# Patient Record
Sex: FEMALE | Race: WHITE | Employment: FULL TIME | URBAN - METROPOLITAN AREA
[De-identification: names, ages, dates, MRNs, and addresses within clinical notes are randomized per-mention and may not be internally consistent; named-entity substitution may affect disease eponyms.]

---

## 2017-09-21 ENCOUNTER — OFFICE VISIT (OUTPATIENT)
Dept: FAMILY MEDICINE CLINIC | Age: 47
End: 2017-09-21

## 2017-09-21 VITALS
SYSTOLIC BLOOD PRESSURE: 119 MMHG | HEIGHT: 62 IN | OXYGEN SATURATION: 98 % | WEIGHT: 135.8 LBS | RESPIRATION RATE: 16 BRPM | HEART RATE: 87 BPM | DIASTOLIC BLOOD PRESSURE: 80 MMHG | BODY MASS INDEX: 24.99 KG/M2 | TEMPERATURE: 98.3 F

## 2017-09-21 DIAGNOSIS — Z00.00 ROUTINE GENERAL MEDICAL EXAMINATION AT A HEALTH CARE FACILITY: Primary | ICD-10-CM

## 2017-09-21 DIAGNOSIS — L72.9 SCALP CYST: ICD-10-CM

## 2017-09-21 DIAGNOSIS — Z11.3 SCREEN FOR STD (SEXUALLY TRANSMITTED DISEASE): ICD-10-CM

## 2017-09-21 RX ORDER — ALPRAZOLAM 0.5 MG/1
TABLET ORAL 2 TIMES DAILY
COMMUNITY
End: 2021-11-23 | Stop reason: SDUPTHER

## 2017-09-21 RX ORDER — ZOLPIDEM TARTRATE 10 MG/1
TABLET ORAL
COMMUNITY
End: 2021-03-22 | Stop reason: SDUPTHER

## 2017-09-21 RX ORDER — OXCARBAZEPINE 300 MG/1
TABLET, FILM COATED ORAL 3 TIMES DAILY
COMMUNITY
End: 2021-02-23

## 2017-09-21 RX ORDER — DEXTROAMPHETAMINE SACCHARATE, AMPHETAMINE ASPARTATE, DEXTROAMPHETAMINE SULFATE AND AMPHETAMINE SULFATE 2.5; 2.5; 2.5; 2.5 MG/1; MG/1; MG/1; MG/1
20 TABLET ORAL 3 TIMES DAILY
COMMUNITY
Start: 2017-09-05 | End: 2021-03-25 | Stop reason: SDUPTHER

## 2017-09-21 NOTE — PROGRESS NOTES
Chief Complaint   Patient presents with    New Patient     Establish Care    Physical     Patient seen in the office today to establish care and a physical. Patient is requesting labs    Subjective: (As above and below)     Chief Complaint   Patient presents with    New Patient     Establish Care    Physical     she is a 52y.o. year old female who presents for evaluation. Reviewed PmHx, RxHx, FmHx, SocHx, AllgHx and updated in chart. Review of Systems - negative except as listed above    Objective:     Vitals:    09/21/17 1422   BP: 119/80   Pulse: 87   Resp: 16   Temp: 98.3 °F (36.8 °C)   TempSrc: Oral   SpO2: 98%   Weight: 135 lb 12.8 oz (61.6 kg)   Height: 5' 2\" (1.575 m)     Physical Examination: General appearance - alert, well appearing, and in no distress  Mental status - normal mood, behavior, speech, dress, motor activity, and thought processes  Mouth - mucous membranes moist, pharynx normal without lesions  Chest - clear to auscultation, no wheezes, rales or rhonchi, symmetric air entry  Heart - normal rate, regular rhythm, normal S1, S2, no murmurs, rubs, clicks or gallops  Musculoskeletal - no joint tenderness, deformity or swelling  Extremities - peripheral pulses normal, no pedal edema, no clubbing or cyanosis    Assessment/ Plan:   1. Routine general medical examination at a health care facility  Check labs  - CBC WITH AUTOMATED DIFF  - LIPID PANEL  - METABOLIC PANEL, COMPREHENSIVE  - TSH 3RD GENERATION  - VITAMIN D, 25 HYDROXY  - HEMOGLOBIN A1C WITH EAG    2. Screen for STD (sexually transmitted disease)  - HIV 1/2 AG/AB, 4TH GENERATION,W RFLX CONFIRM  - RPR  - HEPATITIS C AB  - NUSWAB VAGINITIS PLUS     Follow-up Disposition: As needed  I have discussed the diagnosis with the patient and the intended plan as seen in the above orders. The patient has received an after-visit summary and questions were answered concerning future plans.      Medication Side Effects and Warnings were discussed with patient: yes  Patient Labs were reviewed: yes  Patient Past Records were reviewed:  yes    Dariusz Lunsford M.D.

## 2017-09-21 NOTE — MR AVS SNAPSHOT
Visit Information Date & Time Provider Department Dept. Phone Encounter #  
 9/21/2017  2:00 PM Dewey White MD [address] [facility] [phone] [id number] Upcoming Health Maintenance Date Due DTaP/Tdap/Td series (1 - Tdap) 6/3/1991 PAP AKA CERVICAL CYTOLOGY 6/3/1991 Allergies as of 9/21/2017  Review Complete On: 9/21/2017 By: Janay Crane MD  
  
 Severity Noted Reaction Type Reactions Pcn [Penicillins]  09/21/2017    Unknown (comments) Pt states reaction as toddler, does not know reaction Current Immunizations  Never Reviewed Name Date Influenza Vaccine 9/10/2017 Not reviewed this visit You Were Diagnosed With   
  
 Codes Comments Routine general medical examination at a health care facility    -  Primary ICD-10-CM: Z00.00 ICD-9-CM: V70.0 Screen for STD (sexually transmitted disease)     ICD-10-CM: Z11.3 ICD-9-CM: V74.5 Vitals BP Pulse Temp Resp Height(growth percentile) Weight(growth percentile) 119/80 (BP 1 Location: Left arm, BP Patient Position: Sitting) 87 98.3 °F (36.8 °C) (Oral) 16 5' 2\" (1.575 m) 135 lb 12.8 oz (61.6 kg) LMP SpO2 BMI OB Status Smoking Status 09/15/2017 98% 24.84 kg/m2 Having regular periods Current Some Day Smoker Vitals History BMI and BSA Data Body Mass Index Body Surface Area  
 24.84 kg/m 2 1.64 m 2 Preferred Pharmacy Pharmacy Name Phone [facility] [address], [address] [phone] Your Updated Medication List  
  
   
This list is accurate as of: 9/21/17  3:04 PM.  Always use your most recent med list.  
  
  
  
  
 AMBIEN 10 mg tablet Generic drug:  zolpidem Take  by mouth nightly as needed for Sleep. dextroamphetamine-amphetamine 10 mg tablet Commonly known as:  ADDERALL 30 mg daily. OXcarbazepine 300 mg tablet Commonly known as:  TRILEPTAL  
 Take  by mouth three (3) times daily. XANAX 0.5 mg tablet Generic drug:  ALPRAZolam  
Take  by mouth two (2) times a day. Indications: prn We Performed the Following CBC WITH AUTOMATED DIFF [13241 CPT(R)] HEMOGLOBIN A1C WITH EAG [81802 CPT(R)] HEPATITIS C AB [22314 CPT(R)] HIV 1/2 AG/AB, 4TH GENERATION,W RFLX CONFIRM [[id number] Custom] LIPID PANEL [23158 CPT(R)] METABOLIC PANEL, COMPREHENSIVE [96098 CPT(R)] [address] [id number] Custom] RPR [87103 CPT(R)] TSH 3RD GENERATION [85152 CPT(R)] VITAMIN D, 25 HYDROXY [id number] CPT(R)] Introducing [facility]! Gage Marshall introduces Welocalize patient portal. Now you can access parts of your medical record, email your doctor's office, and request medication refills online. 1. In your internet browser, go to [email or web address] 2. Click on the First Time User? Click Here link in the Sign In box. You will see the New Member Sign Up page. 3. Enter your Welocalize Access Code exactly as it appears below. You will not need to use this code after youve completed the sign-up process. If you do not sign up before the expiration date, you must request a new code. · Welocalize Access Code: [email or web address] Expires: 12/20/2017  3:03 PM 
 
4. Enter the last four digits of your Social Security Number (xxxx) and Date of Birth (mm/dd/yyyy) as indicated and click Submit. You will be taken to the next sign-up page. 5. Create a Welocalize ID. This will be your Welocalize login ID and cannot be changed, so think of one that is secure and easy to remember. 6. Create a Welocalize password. You can change your password at any time. 7. Enter your Password Reset Question and Answer. This can be used at a later time if you forget your password. 8. Enter your e-mail address. You will receive e-mail notification when new information is available in [address] 9. Click Sign Up. You can now view and download portions of your medical record. 10. Click the Download Summary menu link to download a portable copy of your medical information. If you have questions, please visit the Frequently Asked Questions section of the eRepublik website. Remember, eRepublik is NOT to be used for urgent needs. For medical emergencies, dial 911. Now available from your iPhone and Android! Please provide this summary of care documentation to your next provider. Your primary care clinician is listed as Shaw White. If you have any questions after today's visit, please call [phone].

## 2017-09-21 NOTE — PROGRESS NOTES
Chief Complaint   Patient presents with    New Patient     Establish Care    Physical     Patient seen in the office today to establish care and a physical. Patient is requesting labs

## 2017-09-22 LAB
25(OH)D3+25(OH)D2 SERPL-MCNC: 40.4 NG/ML (ref 30–100)
ALBUMIN SERPL-MCNC: 4.6 G/DL (ref 3.5–5.5)
ALBUMIN/GLOB SERPL: 1.7 {RATIO} (ref 1.2–2.2)
ALP SERPL-CCNC: 64 IU/L (ref 39–117)
ALT SERPL-CCNC: 14 IU/L (ref 0–32)
AST SERPL-CCNC: 19 IU/L (ref 0–40)
BASOPHILS # BLD AUTO: 0 X10E3/UL (ref 0–0.2)
BASOPHILS NFR BLD AUTO: 0 %
BILIRUB SERPL-MCNC: 0.3 MG/DL (ref 0–1.2)
BUN SERPL-MCNC: 11 MG/DL (ref 6–24)
BUN/CREAT SERPL: 15 (ref 9–23)
CALCIUM SERPL-MCNC: 9.6 MG/DL (ref 8.7–10.2)
CHLORIDE SERPL-SCNC: 100 MMOL/L (ref 96–106)
CHOLEST SERPL-MCNC: 228 MG/DL (ref 100–199)
CO2 SERPL-SCNC: 21 MMOL/L (ref 18–29)
CREAT SERPL-MCNC: 0.74 MG/DL (ref 0.57–1)
EOSINOPHIL # BLD AUTO: 0.2 X10E3/UL (ref 0–0.4)
EOSINOPHIL NFR BLD AUTO: 2 %
ERYTHROCYTE [DISTWIDTH] IN BLOOD BY AUTOMATED COUNT: 13.3 % (ref 12.3–15.4)
EST. AVERAGE GLUCOSE BLD GHB EST-MCNC: 103 MG/DL
GLOBULIN SER CALC-MCNC: 2.7 G/DL (ref 1.5–4.5)
GLUCOSE SERPL-MCNC: 82 MG/DL (ref 65–99)
HBA1C MFR BLD: 5.2 % (ref 4.8–5.6)
HBV SURFACE AB SER QL: NON REACTIVE
HCT VFR BLD AUTO: 39.2 % (ref 34–46.6)
HCV AB S/CO SERPL IA: <0.1 S/CO RATIO (ref 0–0.9)
HDLC SERPL-MCNC: 66 MG/DL
HGB BLD-MCNC: 13.6 G/DL (ref 11.1–15.9)
HIV 1+2 AB+HIV1 P24 AG SERPL QL IA: NON REACTIVE
IMM GRANULOCYTES # BLD: 0 X10E3/UL (ref 0–0.1)
IMM GRANULOCYTES NFR BLD: 0 %
INTERPRETATION, 910389: NORMAL
LDLC SERPL CALC-MCNC: 140 MG/DL (ref 0–99)
LYMPHOCYTES # BLD AUTO: 1.8 X10E3/UL (ref 0.7–3.1)
LYMPHOCYTES NFR BLD AUTO: 20 %
MCH RBC QN AUTO: 33.3 PG (ref 26.6–33)
MCHC RBC AUTO-ENTMCNC: 34.7 G/DL (ref 31.5–35.7)
MCV RBC AUTO: 96 FL (ref 79–97)
MONOCYTES # BLD AUTO: 0.7 X10E3/UL (ref 0.1–0.9)
MONOCYTES NFR BLD AUTO: 8 %
NEUTROPHILS # BLD AUTO: 6.1 X10E3/UL (ref 1.4–7)
NEUTROPHILS NFR BLD AUTO: 70 %
PLATELET # BLD AUTO: 317 X10E3/UL (ref 150–379)
POTASSIUM SERPL-SCNC: 4.1 MMOL/L (ref 3.5–5.2)
PROT SERPL-MCNC: 7.3 G/DL (ref 6–8.5)
RBC # BLD AUTO: 4.09 X10E6/UL (ref 3.77–5.28)
RPR SER QL: NON REACTIVE
SODIUM SERPL-SCNC: 141 MMOL/L (ref 134–144)
TRIGL SERPL-MCNC: 112 MG/DL (ref 0–149)
TSH SERPL DL<=0.005 MIU/L-ACNC: 0.71 UIU/ML (ref 0.45–4.5)
VLDLC SERPL CALC-MCNC: 22 MG/DL (ref 5–40)
WBC # BLD AUTO: 8.9 X10E3/UL (ref 3.4–10.8)

## 2017-09-22 NOTE — PROGRESS NOTES
Pt is not immune to Hep B, she needs to complete a 3 vaccine series. Please inform. All other labs are within normal limits.

## 2017-09-24 LAB
A VAGINAE DNA VAG QL NAA+PROBE: NORMAL SCORE
BVAB2 DNA VAG QL NAA+PROBE: NORMAL SCORE
C ALBICANS DNA VAG QL NAA+PROBE: NEGATIVE
C GLABRATA DNA VAG QL NAA+PROBE: NEGATIVE
C TRACH RRNA SPEC QL NAA+PROBE: NEGATIVE
MEGA1 DNA VAG QL NAA+PROBE: NORMAL SCORE
N GONORRHOEA RRNA SPEC QL NAA+PROBE: NEGATIVE
T VAGINALIS RRNA SPEC QL NAA+PROBE: NEGATIVE

## 2017-09-27 ENCOUNTER — OFFICE VISIT (OUTPATIENT)
Dept: SURGERY | Age: 47
End: 2017-09-27

## 2017-09-27 VITALS
HEART RATE: 94 BPM | WEIGHT: 135 LBS | SYSTOLIC BLOOD PRESSURE: 140 MMHG | BODY MASS INDEX: 24.84 KG/M2 | DIASTOLIC BLOOD PRESSURE: 90 MMHG | HEIGHT: 62 IN | OXYGEN SATURATION: 98 % | RESPIRATION RATE: 20 BRPM

## 2017-09-27 DIAGNOSIS — L72.3 SEBACEOUS CYST: Primary | ICD-10-CM

## 2017-09-27 NOTE — MR AVS SNAPSHOT
Visit Information Date & Time Provider Department Dept. Phone Encounter #  
 9/27/2017  3:20 PM Lamar Henao MD [address] [phone] Upcoming Health Maintenance Date Due DTaP/Tdap/Td series (1 - Tdap) 6/3/1991 PAP AKA CERVICAL CYTOLOGY 6/3/1991 Allergies as of 9/27/2017  Review Complete On: 9/27/2017 By: Laya Gentile Severity Noted Reaction Type Reactions Pcn [Penicillins]  09/21/2017    Unknown (comments) Pt states reaction as toddler, does not know reaction Current Immunizations  Never Reviewed Name Date Influenza Vaccine 9/10/2017 Not reviewed this visit Vitals BP Pulse Resp Height(growth percentile) Weight(growth percentile) LMP  
 140/90 (BP 1 Location: Left arm, BP Patient Position: Sitting) 94 20 5' 2\" (1.575 m) 135 lb (61.2 kg) 09/15/2017 SpO2 BMI OB Status Smoking Status 98% 24.69 kg/m2 Having regular periods Current Some Day Smoker Vitals History BMI and BSA Data Body Mass Index Body Surface Area  
 24.69 kg/m 2 1.64 m 2 Preferred Pharmacy Pharmacy Name Phone Jadon Holden [address], [address] [phone] Your Updated Medication List  
  
   
This list is accurate as of: 9/27/17  4:11 PM.  Always use your most recent med list.  
  
  
  
  
 AMBIEN 10 mg tablet Generic drug:  zolpidem Take  by mouth nightly as needed for Sleep. dextroamphetamine-amphetamine 10 mg tablet Commonly known as:  ADDERALL 30 mg daily. OXcarbazepine 300 mg tablet Commonly known as:  TRILEPTAL Take  by mouth three (3) times daily. XANAX 0.5 mg tablet Generic drug:  ALPRAZolam  
Take  by mouth two (2) times a day. Indications: prn Introducing [facility]!    
 Mariella Fernandes introduces [employer] patient portal. Now you can access parts of your medical record, email your doctor's office, and request medication refills online. 1. In your internet browser, go to [email or web address] 2. Click on the First Time User? Click Here link in the Sign In box. You will see the New Member Sign Up page. 3. Enter your MarkMonitor Access Code exactly as it appears below. You will not need to use this code after youve completed the sign-up process. If you do not sign up before the expiration date, you must request a new code. · MarkMonitor Access Code: [email or web address] Expires: 12/20/2017  3:03 PM 
 
4. Enter the last four digits of your Social Security Number (xxxx) and Date of Birth (mm/dd/yyyy) as indicated and click Submit. You will be taken to the next sign-up page. 5. Create a MarkMonitor ID. This will be your MarkMonitor login ID and cannot be changed, so think of one that is secure and easy to remember. 6. Create a MarkMonitor password. You can change your password at any time. 7. Enter your Password Reset Question and Answer. This can be used at a later time if you forget your password. 8. Enter your e-mail address. You will receive e-mail notification when new information is available in [address] 9. Click Sign Up. You can now view and download portions of your medical record. 10. Click the Download Summary menu link to download a portable copy of your medical information. If you have questions, please visit the Frequently Asked Questions section of the MarkMonitor website. Remember, MarkMonitor is NOT to be used for urgent needs. For medical emergencies, dial 911. Now available from your iPhone and Android! Please provide this summary of care documentation to your next provider. Your primary care clinician is listed as Shaw White. If you have any questions after today's visit, please call [phone].

## 2017-09-27 NOTE — PROGRESS NOTES
1. Have you been to the ER, urgent care clinic since your last visit? Hospitalized since your last visit? No    2. Have you seen or consulted any other health care providers outside of the [address] [place] [address] since your last visit? Include any pap smears or colon screening.  No

## 2017-09-27 NOTE — LETTER
9/28/2017 2:36 PM 
 
Patient:  Kitty Dunham YOB: 1970 Date of Visit: 9/27/2017 Dear Aarno Sabillon MD 
[address] [address] [place] [address] VIA In Basket 
 : Thank you for referring Ms. Marlene Borja to me for evaluation/treatment. Below are the relevant portions of my assessment and plan of care. If you have questions, please do not hesitate to call me. I look forward to following Ms. Sigala along with you. Sincerely, Hanane Diaz MD

## 2017-09-28 NOTE — PATIENT INSTRUCTIONS
Epidermoid Cyst: Care Instructions  Your Care Instructions  An epidermoid (say \"vm-exo-JAF-hamida\") cyst is a lump just under the skin. These cysts can form when a hair follicle becomes blocked. They are common in acne and may occur on the face, neck, back, and genitals. However, they can form anywhere on the body. These cysts are not cancer and do not lead to cancer. They tend not to hurt, but they can sometimes become swollen and painful. They also may break open (rupture) and cause scarring. These cysts sometimes do not cause problems and may not need treatment. If you have a cyst that is swollen and hurts, your doctor may inject it with a medicine to help it heal. But it is more likely that a painful cyst will need to be removed. Your doctor will give you a shot of numbing medicine and cut into the cyst to drain it or remove it. This makes the symptoms go away. Follow-up care is a key part of your treatment and safety. Be sure to make and go to all appointments, and call your doctor if you are having problems. Its also a good idea to know your test results and keep a list of the medicines you take. How can you care for yourself at home? · Do not squeeze the cyst or poke it with a needle to open it. This can cause swelling, redness, and infection. · Always have a doctor look at any new lumps you get to make sure that they are not serious. When should you call for help? Watch closely for changes in your health, and be sure to contact your doctor if:  · You have a fever, redness, or swelling after you get a shot of medicine in the cyst.  · You see or feel a new lump on your skin. Where can you learn more? Go to http://ziabela-jimi.info/. Enter F504 in the search box to learn more about \"Epidermoid Cyst: Care Instructions. \"  Current as of: October 13, 2016  Content Version: 11.3  © 8141-5204 [employer].  Care instructions adapted under license by [employer] (which disclaims liability or warranty for this information). If you have questions about a medical condition or this instruction, always ask your healthcare professional. [address] any warranty or liability for your use of this information.

## 2017-09-28 NOTE — PROGRESS NOTES
[facility] History and Physical    History of Present Illness:      Steven Prince is a 52 y.o. female who has a sebaceous cyst of the scalp. The cyst has been present for many years but now is getting a little bigger. She does no have any pain, no drainage and no hx of infection. She would like to have it removed due to its large size. She hits it when she combing her hair and is also scarred it could get infected. Past Medical History:   Diagnosis Date    Anxiety     Depression     Hair loss     Poor appetite        Past Surgical History:   Procedure Laterality Date    HX GYN      2 C-sections         Current Outpatient Prescriptions:     dextroamphetamine-amphetamine (ADDERALL) 10 mg tablet, 30 mg daily. , Disp: , Rfl:     OXcarbazepine (TRILEPTAL) 300 mg tablet, Take  by mouth three (3) times daily. , Disp: , Rfl:     ALPRAZolam (XANAX) 0.5 mg tablet, Take  by mouth two (2) times a day. Indications: prn, Disp: , Rfl:     zolpidem (AMBIEN) 10 mg tablet, Take  by mouth nightly as needed for Sleep., Disp: , Rfl:     Allergies   Allergen Reactions    Pcn [Penicillins] Unknown (comments)     Pt states reaction as toddler, does not know reaction       Social History     Social History    Marital status:      Spouse name: N/A    Number of children: N/A    Years of education: N/A     Occupational History    Not on file.      Social History Main Topics    Smoking status: Current Some Day Smoker    Smokeless tobacco: Never Used    Alcohol use 6.0 oz/week     10 Glasses of wine per week      Comment: Weekly    Drug use: No    Sexual activity: Yes     Partners: Male     Other Topics Concern    Not on file     Social History Narrative       Family History   Problem Relation Age of Onset    Hypertension Mother     Hypertension Father     Cancer Maternal Aunt      Breast       ROS   Constitutional: negative  Ears, Nose, Mouth, Throat, and Face: negative  Respiratory: negative  Cardiovascular: negative  Gastrointestinal: negative  Genitourinary:negative  Integument/Breast: scalp sebaceous cyst  Hematologic/Lymphatic: negative  Behavioral/Psychiatric: negative  Allergic/Immunologic: negative      Physical Exam:     Visit Vitals    /90 (BP 1 Location: Left arm, BP Patient Position: Sitting)    Pulse 94    Resp 20    Ht 5' 2\" (1.575 m)    Wt 135 lb (61.2 kg)    SpO2 98%    BMI 24.69 kg/m2       General - alert and oriented, no apparent distress  HEENT - no jaundice, no hearing imparement  Pulm - CTAB, no C/W/R  CV - RRR, no M/R/G  Abd - soft, ND, BS present, NTTP  Ext - pulses intact in UE and LE bilaterally, no edema  Skin - supple, no rashes, L side of scalp with a 2cm sebaceous cyst present, no erythema, no drainage, soft, NTTP  Psychiatric - normal affect, good mood    Labs  none    Imaging  none  I have reviewed and agree with all of the pertinent images    Assessment:     Comfort Aragon is a 52 y.o. female with sebaceous cyst of the scalp    Recommendations:     1. She will need excision of the sebaceous cyst in the OR. I have discussed the above procedure with the patient in detail. We reviewed the benefits and possible complications of the surgery which include bleeding, infection, damage to adjacent organs, venous thromboembolism, need for repeat surgery, death and other unforseen complications. The patient agreed to proceed with the surgery. Sonia Costello MD    Ms. Pattie Valentin has a reminder for a \"due or due soon\" health maintenance. I have asked that she contact her primary care provider for follow-up on this health maintenance.

## 2017-10-05 ENCOUNTER — ANESTHESIA EVENT (OUTPATIENT)
Dept: SURGERY | Age: 47
End: 2017-10-05
Payer: COMMERCIAL

## 2017-10-06 ENCOUNTER — ANESTHESIA (OUTPATIENT)
Dept: SURGERY | Age: 47
End: 2017-10-06
Payer: COMMERCIAL

## 2017-10-06 ENCOUNTER — HOSPITAL ENCOUNTER (OUTPATIENT)
Age: 47
Setting detail: OUTPATIENT SURGERY
Discharge: HOME | End: 2017-10-06
Attending: SURGERY | Admitting: SURGERY
Payer: COMMERCIAL

## 2017-10-06 VITALS
DIASTOLIC BLOOD PRESSURE: 69 MMHG | SYSTOLIC BLOOD PRESSURE: 113 MMHG | TEMPERATURE: 98 F | BODY MASS INDEX: 24.84 KG/M2 | HEIGHT: 62 IN | HEART RATE: 73 BPM | WEIGHT: 135 LBS | OXYGEN SATURATION: 95 % | RESPIRATION RATE: 16 BRPM

## 2017-10-06 LAB — HCG UR QL: NEGATIVE

## 2017-10-06 PROCEDURE — 76210000020 HC REC RM PH II FIRST 0.5 HR: Performed by: SURGERY

## 2017-10-06 PROCEDURE — 74011250636 HC RX REV CODE- 250/636: Performed by: SURGERY

## 2017-10-06 PROCEDURE — 74011000250 HC RX REV CODE- 250: Performed by: SURGERY

## 2017-10-06 PROCEDURE — 74011250637 HC RX REV CODE- 250/637: Performed by: ANESTHESIOLOGY

## 2017-10-06 PROCEDURE — 74011000250 HC RX REV CODE- 250

## 2017-10-06 PROCEDURE — 74011250636 HC RX REV CODE- 250/636: Performed by: ANESTHESIOLOGY

## 2017-10-06 PROCEDURE — 76010000149 HC OR TIME 1 TO 1.5 HR: Performed by: SURGERY

## 2017-10-06 PROCEDURE — 77030018836 HC SOL IRR NACL ICUM -A: Performed by: SURGERY

## 2017-10-06 PROCEDURE — 88304 TISSUE EXAM BY PATHOLOGIST: CPT | Performed by: SURGERY

## 2017-10-06 PROCEDURE — 76210000016 HC OR PH I REC 1 TO 1.5 HR: Performed by: SURGERY

## 2017-10-06 PROCEDURE — 76060000033 HC ANESTHESIA 1 TO 1.5 HR: Performed by: SURGERY

## 2017-10-06 PROCEDURE — 77030010507 HC ADH SKN DERMBND J&J -B: Performed by: SURGERY

## 2017-10-06 PROCEDURE — 74011250636 HC RX REV CODE- 250/636

## 2017-10-06 PROCEDURE — 77030011640 HC PAD GRND REM COVD -A: Performed by: SURGERY

## 2017-10-06 PROCEDURE — 81025 URINE PREGNANCY TEST: CPT

## 2017-10-06 PROCEDURE — 77030020782 HC GWN BAIR PAWS FLX 3M -B

## 2017-10-06 RX ORDER — LIDOCAINE HYDROCHLORIDE 10 MG/ML
0.1 INJECTION, SOLUTION EPIDURAL; INFILTRATION; INTRACAUDAL; PERINEURAL AS NEEDED
Status: DISCONTINUED | OUTPATIENT
Start: 2017-10-06 | End: 2017-10-06 | Stop reason: HOSPADM

## 2017-10-06 RX ORDER — ROPIVACAINE HYDROCHLORIDE 5 MG/ML
150 INJECTION, SOLUTION EPIDURAL; INFILTRATION; PERINEURAL AS NEEDED
Status: DISCONTINUED | OUTPATIENT
Start: 2017-10-06 | End: 2017-10-06 | Stop reason: HOSPADM

## 2017-10-06 RX ORDER — LIDOCAINE HYDROCHLORIDE 20 MG/ML
INJECTION, SOLUTION EPIDURAL; INFILTRATION; INTRACAUDAL; PERINEURAL AS NEEDED
Status: DISCONTINUED | OUTPATIENT
Start: 2017-10-06 | End: 2017-10-06 | Stop reason: HOSPADM

## 2017-10-06 RX ORDER — SODIUM CHLORIDE 0.9 % (FLUSH) 0.9 %
5-10 SYRINGE (ML) INJECTION AS NEEDED
Status: DISCONTINUED | OUTPATIENT
Start: 2017-10-06 | End: 2017-10-06 | Stop reason: HOSPADM

## 2017-10-06 RX ORDER — MIDAZOLAM HYDROCHLORIDE 1 MG/ML
0.5 INJECTION, SOLUTION INTRAMUSCULAR; INTRAVENOUS
Status: DISCONTINUED | OUTPATIENT
Start: 2017-10-06 | End: 2017-10-06 | Stop reason: HOSPADM

## 2017-10-06 RX ORDER — DEXAMETHASONE SODIUM PHOSPHATE 4 MG/ML
INJECTION, SOLUTION INTRA-ARTICULAR; INTRALESIONAL; INTRAMUSCULAR; INTRAVENOUS; SOFT TISSUE AS NEEDED
Status: DISCONTINUED | OUTPATIENT
Start: 2017-10-06 | End: 2017-10-06 | Stop reason: HOSPADM

## 2017-10-06 RX ORDER — SODIUM CHLORIDE 0.9 % (FLUSH) 0.9 %
5-10 SYRINGE (ML) INJECTION EVERY 8 HOURS
Status: DISCONTINUED | OUTPATIENT
Start: 2017-10-06 | End: 2017-10-06 | Stop reason: HOSPADM

## 2017-10-06 RX ORDER — MIDAZOLAM HYDROCHLORIDE 1 MG/ML
1 INJECTION, SOLUTION INTRAMUSCULAR; INTRAVENOUS AS NEEDED
Status: DISCONTINUED | OUTPATIENT
Start: 2017-10-06 | End: 2017-10-06 | Stop reason: HOSPADM

## 2017-10-06 RX ORDER — SODIUM CHLORIDE, SODIUM LACTATE, POTASSIUM CHLORIDE, CALCIUM CHLORIDE 600; 310; 30; 20 MG/100ML; MG/100ML; MG/100ML; MG/100ML
100 INJECTION, SOLUTION INTRAVENOUS CONTINUOUS
Status: DISCONTINUED | OUTPATIENT
Start: 2017-10-06 | End: 2017-10-06 | Stop reason: HOSPADM

## 2017-10-06 RX ORDER — PROPOFOL 10 MG/ML
INJECTION, EMULSION INTRAVENOUS
Status: DISCONTINUED | OUTPATIENT
Start: 2017-10-06 | End: 2017-10-06 | Stop reason: HOSPADM

## 2017-10-06 RX ORDER — FENTANYL CITRATE 50 UG/ML
25 INJECTION, SOLUTION INTRAMUSCULAR; INTRAVENOUS
Status: DISCONTINUED | OUTPATIENT
Start: 2017-10-06 | End: 2017-10-06 | Stop reason: HOSPADM

## 2017-10-06 RX ORDER — DIPHENHYDRAMINE HYDROCHLORIDE 50 MG/ML
12.5 INJECTION, SOLUTION INTRAMUSCULAR; INTRAVENOUS AS NEEDED
Status: DISCONTINUED | OUTPATIENT
Start: 2017-10-06 | End: 2017-10-06 | Stop reason: HOSPADM

## 2017-10-06 RX ORDER — MIDAZOLAM HYDROCHLORIDE 1 MG/ML
INJECTION, SOLUTION INTRAMUSCULAR; INTRAVENOUS AS NEEDED
Status: DISCONTINUED | OUTPATIENT
Start: 2017-10-06 | End: 2017-10-06 | Stop reason: HOSPADM

## 2017-10-06 RX ORDER — PROPOFOL 10 MG/ML
INJECTION, EMULSION INTRAVENOUS AS NEEDED
Status: DISCONTINUED | OUTPATIENT
Start: 2017-10-06 | End: 2017-10-06 | Stop reason: HOSPADM

## 2017-10-06 RX ORDER — CEFAZOLIN SODIUM IN 0.9 % NACL 2 G/50 ML
2 INTRAVENOUS SOLUTION, PIGGYBACK (ML) INTRAVENOUS ONCE
Status: COMPLETED | OUTPATIENT
Start: 2017-10-06 | End: 2017-10-06

## 2017-10-06 RX ORDER — BUPIVACAINE HYDROCHLORIDE AND EPINEPHRINE 5; 5 MG/ML; UG/ML
30 INJECTION, SOLUTION EPIDURAL; INTRACAUDAL; PERINEURAL ONCE
Status: COMPLETED | OUTPATIENT
Start: 2017-10-07 | End: 2017-10-06

## 2017-10-06 RX ORDER — FENTANYL CITRATE 50 UG/ML
50 INJECTION, SOLUTION INTRAMUSCULAR; INTRAVENOUS AS NEEDED
Status: DISCONTINUED | OUTPATIENT
Start: 2017-10-06 | End: 2017-10-06 | Stop reason: HOSPADM

## 2017-10-06 RX ORDER — MORPHINE SULFATE 10 MG/ML
2 INJECTION, SOLUTION INTRAMUSCULAR; INTRAVENOUS
Status: DISCONTINUED | OUTPATIENT
Start: 2017-10-06 | End: 2017-10-06 | Stop reason: HOSPADM

## 2017-10-06 RX ORDER — ONDANSETRON 2 MG/ML
4 INJECTION INTRAMUSCULAR; INTRAVENOUS ONCE
Status: COMPLETED | OUTPATIENT
Start: 2017-10-06 | End: 2017-10-06

## 2017-10-06 RX ORDER — KETAMINE HYDROCHLORIDE 10 MG/ML
INJECTION, SOLUTION INTRAMUSCULAR; INTRAVENOUS AS NEEDED
Status: DISCONTINUED | OUTPATIENT
Start: 2017-10-06 | End: 2017-10-06 | Stop reason: HOSPADM

## 2017-10-06 RX ORDER — OXYCODONE AND ACETAMINOPHEN 5; 325 MG/1; MG/1
1-2 TABLET ORAL
Qty: 30 TAB | Refills: 0 | Status: SHIPPED | OUTPATIENT
Start: 2017-10-06 | End: 2021-02-23 | Stop reason: ALTCHOICE

## 2017-10-06 RX ORDER — ONDANSETRON 2 MG/ML
INJECTION INTRAMUSCULAR; INTRAVENOUS AS NEEDED
Status: DISCONTINUED | OUTPATIENT
Start: 2017-10-06 | End: 2017-10-06 | Stop reason: HOSPADM

## 2017-10-06 RX ORDER — OXYCODONE AND ACETAMINOPHEN 5; 325 MG/1; MG/1
1 TABLET ORAL ONCE
Status: COMPLETED | OUTPATIENT
Start: 2017-10-06 | End: 2017-10-06

## 2017-10-06 RX ORDER — HYDROMORPHONE HYDROCHLORIDE 1 MG/ML
0.2 INJECTION, SOLUTION INTRAMUSCULAR; INTRAVENOUS; SUBCUTANEOUS
Status: DISCONTINUED | OUTPATIENT
Start: 2017-10-06 | End: 2017-10-06 | Stop reason: HOSPADM

## 2017-10-06 RX ORDER — FENTANYL CITRATE 50 UG/ML
INJECTION, SOLUTION INTRAMUSCULAR; INTRAVENOUS AS NEEDED
Status: DISCONTINUED | OUTPATIENT
Start: 2017-10-06 | End: 2017-10-06 | Stop reason: HOSPADM

## 2017-10-06 RX ADMIN — LIDOCAINE HYDROCHLORIDE 100 MG: 20 INJECTION, SOLUTION EPIDURAL; INFILTRATION; INTRACAUDAL; PERINEURAL at 11:57

## 2017-10-06 RX ADMIN — CEFAZOLIN 2 G: 1 INJECTION, POWDER, FOR SOLUTION INTRAMUSCULAR; INTRAVENOUS; PARENTERAL at 12:03

## 2017-10-06 RX ADMIN — SODIUM CHLORIDE, POTASSIUM CHLORIDE, SODIUM LACTATE AND CALCIUM CHLORIDE: 600; 310; 30; 20 INJECTION, SOLUTION INTRAVENOUS at 11:48

## 2017-10-06 RX ADMIN — ONDANSETRON 4 MG: 2 INJECTION INTRAMUSCULAR; INTRAVENOUS at 14:26

## 2017-10-06 RX ADMIN — OXYCODONE HYDROCHLORIDE AND ACETAMINOPHEN 1 TABLET: 5; 325 TABLET ORAL at 13:55

## 2017-10-06 RX ADMIN — ONDANSETRON 4 MG: 2 INJECTION INTRAMUSCULAR; INTRAVENOUS at 12:09

## 2017-10-06 RX ADMIN — Medication 10 ML: at 14:25

## 2017-10-06 RX ADMIN — DEXAMETHASONE SODIUM PHOSPHATE 4 MG: 4 INJECTION, SOLUTION INTRA-ARTICULAR; INTRALESIONAL; INTRAMUSCULAR; INTRAVENOUS; SOFT TISSUE at 12:09

## 2017-10-06 RX ADMIN — MIDAZOLAM HYDROCHLORIDE 2 MG: 1 INJECTION, SOLUTION INTRAMUSCULAR; INTRAVENOUS at 11:57

## 2017-10-06 RX ADMIN — PROPOFOL 50 MG: 10 INJECTION, EMULSION INTRAVENOUS at 11:57

## 2017-10-06 RX ADMIN — FENTANYL CITRATE 25 MCG: 50 INJECTION, SOLUTION INTRAMUSCULAR; INTRAVENOUS at 12:42

## 2017-10-06 RX ADMIN — FENTANYL CITRATE 25 MCG: 50 INJECTION, SOLUTION INTRAMUSCULAR; INTRAVENOUS at 12:36

## 2017-10-06 RX ADMIN — PROPOFOL 50 MCG/KG/MIN: 10 INJECTION, EMULSION INTRAVENOUS at 12:01

## 2017-10-06 RX ADMIN — KETAMINE HYDROCHLORIDE 15 MG: 10 INJECTION, SOLUTION INTRAMUSCULAR; INTRAVENOUS at 12:23

## 2017-10-06 RX ADMIN — MIDAZOLAM HYDROCHLORIDE 3 MG: 1 INJECTION, SOLUTION INTRAMUSCULAR; INTRAVENOUS at 11:48

## 2017-10-06 RX ADMIN — KETAMINE HYDROCHLORIDE 15 MG: 10 INJECTION, SOLUTION INTRAMUSCULAR; INTRAVENOUS at 12:21

## 2017-10-06 RX ADMIN — HYDROMORPHONE HYDROCHLORIDE 0.2 MG: 1 INJECTION, SOLUTION INTRAMUSCULAR; INTRAVENOUS; SUBCUTANEOUS at 13:25

## 2017-10-06 RX ADMIN — HYDROMORPHONE HYDROCHLORIDE 0.2 MG: 1 INJECTION, SOLUTION INTRAMUSCULAR; INTRAVENOUS; SUBCUTANEOUS at 13:10

## 2017-10-06 NOTE — OP NOTES
[address] [facility] [address], [address]   OP NOTE       Name:  Miles Devi   MR#:  [id number]   :  1970   Account #:  [de-identified]    Surgery Date:  10/06/2017   Date of Adm:  10/06/2017       PREOPERATIVE DIAGNOSIS: Scalp cyst.    POSTOPERATIVE DIAGNOSIS: Scalp cyst.    PROCEDURES PERFORMED: Excision of scalp sebaceous cyst.    SURGEON: Tucker Burgso MD     ASSISTANT: Surgical assistant, Jonn Bartlett. INDICATION FOR OPERATION: The patient is a 49-year-old female   with a sebaceous cyst of the scalp that is needing excision in the   operating room. DESCRIPTION OF PROCEDURE: The patient was met in the preop   holding area, the H and P was updated. Consent was signed. All risks   and benefits were explained to the patient prior to the start of the   operation. She was taken back to the operating room. She was lying in   a supine position. She was then placed into a right side down, left side   up decubitus position. The posterior scalp area was prepped and   draped in standard sterile fashion. Time-out was called. Antibiotics   were given. We started the operation by making a 1 x 3 cm elliptical incision over   top of the sebaceous cyst, dissecting through the subcutaneous tissue,   encountering the cyst capsule, dissecting around the cyst capsule and   removing the cyst entirely from the subcutaneous tissue. We then cauterized any bleeding from the wound, irrigated with saline   irrigation and closed the deep dermal layer with interrupted 3-0 Vicryl   sutures and closed the skin with a running 4-0 Monocryl and   Dermabond to complete the operation. Dr. Alex Kay was present and   scrubbed during the entire operation. COUNTS: Correct. ANESTHESIA: MAC.    ESTIMATED BLOOD LOSS: 5 mL. SPECIMENS REMOVED: Posterior scalp sebaceous cyst.    FINDINGS: A 1 x 3 cm sebaceous cyst of the scalp excised. COMPLICATIONS: None.         Aleyda Huerta MD      NL / RLAMBER D:  10/06/2017   13:00   T:  10/06/2017   13:17   Job #:  [id number]

## 2017-10-06 NOTE — IP AVS SNAPSHOT
[address] [facility] [address] [place] [address] 
[phone] Patient: Marlena Lacy MRN: [id number] XDH:[birth year] You are allergic to the following Allergen Reactions Pcn (Penicillins) Unknown (comments) Pt states reaction as toddler, does not know reaction Recent Documentation Height Weight BMI OB Status Smoking Status 1.575 m 61.2 kg 24.69 kg/m2 Having regular periods Current Some Day Smoker Emergency Contacts Name Discharge Info Relation Home Work Mobile Feng Sigala DISCHARGE CAREGIVER [3] Spouse [3] [phone] About your hospitalization You were admitted on:  October 6, 2017 You last received care in the:  [facility] PACU You were discharged on:  October 6, 2017 Unit phone number:  [phone] Why you were hospitalized Your primary diagnosis was:  Not on File Providers Seen During Your Hospitalizations Provider Role Specialty Primary office phone Sherlyn Elise MD Attending Provider General Surgery [phone] Your Primary Care Physician (PCP) Primary Care Physician Office Phone Office Fax [facility] [phone] Follow-up Information Follow up With Details Comments Contact Info Wilner White MD   [address] [facility] 
[phone] Sherlyn Elise MD Schedule an appointment as soon as possible for a visit in 2 week(s) For wound re-check [address] 
[phone] Your Appointments Monday October 23, 2017  9:40 AM EDT  
POST OP with ROXI Montes  
[facility] [address] ([address]) [address] [id number] [phone] Current Discharge Medication List  
  
START taking these medications Dose & Instructions Dispensing Information Comments Morning Noon Evening Bedtime  
 oxyCODONE-acetaminophen 5-325 mg per tablet Commonly known as:  PERCOCET Your last dose was: Your next dose is:    
   
   
 Dose:  1-2 Tab Take 1-2 Tabs by mouth every four (4) hours as needed for Pain. Max Daily Amount: 12 Tabs. Quantity:  30 Tab Refills:  0 CONTINUE these medications which have NOT CHANGED Dose & Instructions Dispensing Information Comments Morning Noon Evening Bedtime AMBIEN 10 mg tablet Generic drug:  zolpidem Your last dose was: Your next dose is: Take  by mouth nightly as needed for Sleep. Refills:  0  
     
   
   
   
  
 dextroamphetamine-amphetamine 10 mg tablet Commonly known as:  ADDERALL Your last dose was: Your next dose is:    
   
   
 Dose:  30 mg  
30 mg daily. Refills:  0 OXcarbazepine 300 mg tablet Commonly known as:  TRILEPTAL Your last dose was: Your next dose is: Take  by mouth three (3) times daily. Refills:  0  
     
   
   
   
  
 XANAX 0.5 mg tablet Generic drug:  ALPRAZolam  
   
Your last dose was: Your next dose is: Take  by mouth two (2) times a day. Indications: prn Refills:  0 Where to Get Your Medications Information on where to get these meds will be given to you by the nurse or doctor. ! Ask your nurse or doctor about these medications  
  oxyCODONE-acetaminophen 5-325 mg per tablet Discharge Instructions Excision of cyst or soft tissue mass Instructions Following Excision of Cyst, Soft Tissue Mass Activity · As tolerated no restrictions · OK TO SHOWER in am 
· Can shampoo in am, do not scrub operative site 
      (dissolvable sutures internal, DERMABOND glue external) Diet · Clear liquids until no nausea or vomiting; then light diet for the first day · Advance to regular diet on second day, unless your doctor orders otherwise · If nausea and vomiting continues, call your doctor Pain PERCOCET given in [email or web address] · Take pain medication as directed by your doctor ·  Call your doctor if pain is NOT relieved by medication · DO NOT take aspirin or blood thinners until directed by your doctor Dressing Care: You may shower tomorrow, no dressing care needed to the wound, otherwise keep it clean, the dermabond will fall off on its own Follow-Up Phone Calls · Call will be made nursing staff · If you have any problems or concerns, call your doctor as needed Call your doctor if you experience: 
· Excessive bleeding that does not stop after holding mild pressure over the area · Temperature of 101° Fahrenheit or above · Redness, excessive swelling or bruising, and/or green or yellow, smelly discharge from incision After Anesthesia · For the first 24 hours: DO NOT drive, drink alcoholic beverages, or make important decisions · Be aware of dizziness following anesthesia and while taking pain medication Other Instructions: none Appointment date/time: Follow-up with Dr. Kenny Gipson in 2 week(s). Call the office to schedule your appointment. Your doctor's phone number: [phone] After general anesthesia or intravenous sedation, for 24 hours or while taking prescription Narcotics: · Limit your activities · Do not drive and operate hazardous machinery · Do not make important personal or business decisions · Do  not drink alcoholic beverages · If you have not urinated within 8 hours after discharge, please contact your surgeon on call. Report the following to your surgeon: 
· Excessive pain, swelling, redness or odor of or around the surgical area · Temperature over 100.5 · Nausea and vomiting lasting longer than 4 hours or if unable to take medications · Any signs of decreased circulation or nerve impairment to extremity: change in color, persistent  numbness, tingling, coldness or increase pain · Any questions Discharge Instructions Attachments/References MEFS - OXYCODONE/ACETAMINOPHEN (PERCOCET, ROXICET) - (BY MOUTH) (ENGLISH) Discharge Orders None Introducing [facility]! Herson Bosch introduces "[employer]" patient portal. Now you can access parts of your medical record, email your doctor's office, and request medication refills online. 1. In your internet browser, go to [email or web address] 2. Click on the First Time User? Click Here link in the Sign In box. You will see the New Member Sign Up page. 3. Enter your "[employer]" Access Code exactly as it appears below. You will not need to use this code after youve completed the sign-up process. If you do not sign up before the expiration date, you must request a new code. · "[employer]" Access Code: [email or web address] Expires: 12/20/2017  3:03 PM 
 
4. Enter the last four digits of your Social Security Number (xxxx) and Date of Birth (mm/dd/yyyy) as indicated and click Submit. You will be taken to the next sign-up page. 5. Create a "[employer]" ID. This will be your "[employer]" login ID and cannot be changed, so think of one that is secure and easy to remember. 6. Create a "[employer]" password. You can change your password at any time. 7. Enter your Password Reset Question and Answer. This can be used at a later time if you forget your password. 8. Enter your e-mail address. You will receive e-mail notification when new information is available in [address] 9. Click Sign Up. You can now view and download portions of your medical record. 10. Click the Download Summary menu link to download a portable copy of your medical information. If you have questions, please visit the Frequently Asked Questions section of the "[employer]" website.  Remember, "[employer]" is NOT to be used for urgent needs. For medical emergencies, dial 911. Now available from your iPhone and Android! General Information Please provide this summary of care documentation to your next provider. Patient Signature:  ____________________________________________________________ Date:  ____________________________________________________________  
  
[facility] Provider Signature:  ____________________________________________________________ Date:  ____________________________________________________________ More Information Oxycodone/Acetaminophen (Percocet, Roxicet) - (By mouth) Why this medicine is used:  
Treats pain. This medicine contains a narcotic pain reliever. Contact a nurse or doctor right away if you have: 
· Extreme weakness, shallow breathing, slow heartbeat · Sweating or cold, clammy skin · Skin blisters, rash, or peeling Common side effects: 
· Constipation · Nausea, vomiting · Tiredness © 2017 [facility] Information is for End User's use only and may not be sold, redistributed or otherwise used for commercial purposes.

## 2017-10-06 NOTE — INTERVAL H&P NOTE
H&P Update:  Reji Preciado was seen and examined. History and physical has been reviewed. The patient has been examined. There have been no significant clinical changes since the completion of the originally dated History and Physical.  Patient identified by surgeon; surgical site was confirmed by patient and surgeon.     Signed By: Clara Salinas MD     October 6, 2017 11:11 AM

## 2017-10-06 NOTE — DISCHARGE INSTRUCTIONS
Excision of cyst or soft tissue mass      Instructions Following Excision of Cyst, Soft Tissue Mass    Activity  · As tolerated no restrictions  · OK TO SHOWER in am  · Can shampoo in am, do not scrub operative site        (dissolvable sutures internal, DERMABOND glue external)    Diet  · Clear liquids until no nausea or vomiting; then light diet for the first day  · Advance to regular diet on second day, unless your doctor orders otherwise  · If nausea and vomiting continues, call your doctor    Pain    PERCOCET given in [email or web address]  · Take pain medication as directed by your doctor  ·  Call your doctor if pain is NOT relieved by medication  · DO NOT take aspirin or blood thinners until directed by your doctor    Dressing Care: You may shower tomorrow, no dressing care needed to the wound, otherwise keep it clean, the dermabond will fall off on its own    Follow-Up Phone Calls  · Call will be made nursing staff  · If you have any problems or concerns, call your doctor as needed    Call your doctor if you experience:  · Excessive bleeding that does not stop after holding mild pressure over the area  · Temperature of 101° Fahrenheit or above  · Redness, excessive swelling or bruising, and/or green or yellow, smelly discharge from incision    After Anesthesia  · For the first 24 hours: DO NOT drive, drink alcoholic beverages, or make important decisions  · Be aware of dizziness following anesthesia and while taking pain medication    Other Instructions: none    Appointment date/time: Follow-up with Dr. Lana Jackson in 2 week(s). Call the office to schedule your appointment.     Your doctor's phone number: [phone]      After general anesthesia or intravenous sedation, for 24 hours or while taking prescription Narcotics:  · Limit your activities  · Do not drive and operate hazardous machinery  · Do not make important personal or business decisions  · Do  not drink alcoholic beverages  · If you have not urinated within 8 hours after discharge, please contact your surgeon on call.     Report the following to your surgeon:  · Excessive pain, swelling, redness or odor of or around the surgical area  · Temperature over 100.5  · Nausea and vomiting lasting longer than 4 hours or if unable to take medications  · Any signs of decreased circulation or nerve impairment to extremity: change in color, persistent  numbness, tingling, coldness or increase pain  · Any questions

## 2017-10-06 NOTE — BRIEF OP NOTE
BRIEF OPERATIVE NOTE    Date of Procedure: 10/6/2017   Preoperative Diagnosis: SCALP CYST  Postoperative Diagnosis: SCALP CYST    Procedure(s):  EXCISION OF SCALP SEBACEOUS CYST  Surgeon(s) and Role:     * Joshua Scanlon MD - Primary         Assistant Staff:       Surgical Staff:  Circ-1: Jie Johnson RN  Circ-Relief: Sara Pathak RN  Scrub RN-1: Patito Mariano RN  Scrub RN-Relief: Sara Pathak RN  Surg Asst-1: Trenton Wylie Time In   Incision Start 1220   Incision Close 1243     Anesthesia: MAC   Estimated Blood Loss: 5cc  Specimens:   ID Type Source Tests Collected by Time Destination   1 : POSTERIOR SCALP SEBACEOUS CYST Fresh Scalp  Joshua Scanlon MD 10/6/2017 1228 Pathology      Findings: 1x3cm sebaceous cyst of the scalp excised   Complications: none  Implants: * No implants in log *

## 2017-10-06 NOTE — ANESTHESIA POSTPROCEDURE EVALUATION
Post-Anesthesia Evaluation and Assessment    Patient: Estrella Juarez MRN: [id number]  SSN: [id number]    YOB: 1970  Age: 52 y.o. Sex: female       Cardiovascular Function/Vital Signs  Visit Vitals    /60    Pulse 79    Temp 37.1 °C (98.8 °F)    Resp 12    Ht 5' 2\" (1.575 m)    Wt 61.2 kg (135 lb)    SpO2 93%    BMI 24.69 kg/m2       Patient is status post general anesthesia for Procedure(s):  EXCISION OF SCALP SEBACEOUS CYST. Nausea/Vomiting: None    Postoperative hydration reviewed and adequate. Pain:  Pain Scale 1: FLACC (10/06/17 1325)  Pain Intensity 1: 9 (10/06/17 1310)   Managed    Neurological Status:   Neuro (WDL): Within Defined Limits (10/06/17 1029)   At baseline    Mental Status and Level of Consciousness: Arousable    Pulmonary Status:   O2 Device: Room air (10/06/17 1300)   Adequate oxygenation and airway patent    Complications related to anesthesia: None    Post-anesthesia assessment completed.  No concerns    Signed By: Radha Johnson MD     October 6, 2017

## 2017-10-06 NOTE — H&P (VIEW-ONLY)
[facility] History and Physical    History of Present Illness:      Macy Olmedo is a 52 y.o. female who has a sebaceous cyst of the scalp. The cyst has been present for many years but now is getting a little bigger. She does no have any pain, no drainage and no hx of infection. She would like to have it removed due to its large size. She hits it when she combing her hair and is also scarred it could get infected. Past Medical History:   Diagnosis Date    Anxiety     Depression     Hair loss     Poor appetite        Past Surgical History:   Procedure Laterality Date    HX GYN      2 C-sections         Current Outpatient Prescriptions:     dextroamphetamine-amphetamine (ADDERALL) 10 mg tablet, 30 mg daily. , Disp: , Rfl:     OXcarbazepine (TRILEPTAL) 300 mg tablet, Take  by mouth three (3) times daily. , Disp: , Rfl:     ALPRAZolam (XANAX) 0.5 mg tablet, Take  by mouth two (2) times a day. Indications: prn, Disp: , Rfl:     zolpidem (AMBIEN) 10 mg tablet, Take  by mouth nightly as needed for Sleep., Disp: , Rfl:     Allergies   Allergen Reactions    Pcn [Penicillins] Unknown (comments)     Pt states reaction as toddler, does not know reaction       Social History     Social History    Marital status:      Spouse name: N/A    Number of children: N/A    Years of education: N/A     Occupational History    Not on file.      Social History Main Topics    Smoking status: Current Some Day Smoker    Smokeless tobacco: Never Used    Alcohol use 6.0 oz/week     10 Glasses of wine per week      Comment: Weekly    Drug use: No    Sexual activity: Yes     Partners: Male     Other Topics Concern    Not on file     Social History Narrative       Family History   Problem Relation Age of Onset    Hypertension Mother     Hypertension Father     Cancer Maternal Aunt      Breast       ROS   Constitutional: negative  Ears, Nose, Mouth, Throat, and Face: negative  Respiratory: negative  Cardiovascular: negative  Gastrointestinal: negative  Genitourinary:negative  Integument/Breast: scalp sebaceous cyst  Hematologic/Lymphatic: negative  Behavioral/Psychiatric: negative  Allergic/Immunologic: negative      Physical Exam:     Visit Vitals    /90 (BP 1 Location: Left arm, BP Patient Position: Sitting)    Pulse 94    Resp 20    Ht 5' 2\" (1.575 m)    Wt 135 lb (61.2 kg)    SpO2 98%    BMI 24.69 kg/m2       General - alert and oriented, no apparent distress  HEENT - no jaundice, no hearing imparement  Pulm - CTAB, no C/W/R  CV - RRR, no M/R/G  Abd - soft, ND, BS present, NTTP  Ext - pulses intact in UE and LE bilaterally, no edema  Skin - supple, no rashes, L side of scalp with a 2cm sebaceous cyst present, no erythema, no drainage, soft, NTTP  Psychiatric - normal affect, good mood    Labs  none    Imaging  none  I have reviewed and agree with all of the pertinent images    Assessment:     Guero Butler is a 52 y.o. female with sebaceous cyst of the scalp    Recommendations:     1. She will need excision of the sebaceous cyst in the OR. I have discussed the above procedure with the patient in detail. We reviewed the benefits and possible complications of the surgery which include bleeding, infection, damage to adjacent organs, venous thromboembolism, need for repeat surgery, death and other unforseen complications. The patient agreed to proceed with the surgery. Carly Daniel MD    Ms. Priyanka Martinez has a reminder for a \"due or due soon\" health maintenance. I have asked that she contact her primary care provider for follow-up on this health maintenance.

## 2017-10-23 ENCOUNTER — OFFICE VISIT (OUTPATIENT)
Dept: SURGERY | Age: 47
End: 2017-10-23

## 2017-10-23 VITALS
DIASTOLIC BLOOD PRESSURE: 72 MMHG | OXYGEN SATURATION: 99 % | HEIGHT: 62 IN | TEMPERATURE: 98.9 F | WEIGHT: 134 LBS | HEART RATE: 96 BPM | RESPIRATION RATE: 20 BRPM | BODY MASS INDEX: 24.66 KG/M2 | SYSTOLIC BLOOD PRESSURE: 122 MMHG

## 2017-10-23 DIAGNOSIS — Z09 FOLLOW-UP EXAMINATION FOLLOWING SURGERY: Primary | ICD-10-CM

## 2017-10-23 NOTE — PROGRESS NOTES
1. Have you been to the ER, urgent care clinic since your last visit? Hospitalized since your last visit? No     2. Have you seen or consulted any other health care providers outside of the [address] [place] [address] since your last visit? Include any pap smears or colon screening.  No

## 2017-11-02 NOTE — PROGRESS NOTES
Chief Complaint   Patient presents with    Surgical Follow-up     2 weeks s/p Excision of scalp sebaceous cyst.       Augustus Garcia is a 52 y.o. female  Presents 2 weeks s/p excision scalp cyst.  She has no complaints other than the glue seems to be matted in her hair. No pain and no drainage. Visit Vitals    /72 (BP 1 Location: Right arm, BP Patient Position: Sitting)    Pulse 96    Temp 98.9 °F (37.2 °C) (Oral)    Resp 20    Ht 5' 2\" (1.575 m)    Wt 134 lb (60.8 kg)    SpO2 99%    BMI 24.51 kg/m2     A+O x 3  Appears well   Posterior scalp incision appears well approximated and glue is matted in hair, no drainage and no erythema       ICD-10-CM ICD-9-CM    1. Follow-up examination following surgery Z09 V67.00      Doing well  Pathology reviewed    posterior scalp, excisional biopsy:   Trichilemmal cyst   Glue and matted hair clipped   May shower and was hair as she normally would   Reassured new hair growth   Discharged from surgical care with prn follow up   Augustus Garcia verbalized understanding and questions were answered to the best of my knowledge and ability. Skin care educational materials were provided.

## 2018-10-08 ENCOUNTER — OFFICE VISIT (OUTPATIENT)
Dept: FAMILY MEDICINE CLINIC | Age: 48
End: 2018-10-08

## 2018-10-08 VITALS
RESPIRATION RATE: 16 BRPM | DIASTOLIC BLOOD PRESSURE: 91 MMHG | SYSTOLIC BLOOD PRESSURE: 146 MMHG | HEART RATE: 101 BPM | OXYGEN SATURATION: 99 % | TEMPERATURE: 99 F | HEIGHT: 62 IN | WEIGHT: 141 LBS | BODY MASS INDEX: 25.95 KG/M2

## 2018-10-08 DIAGNOSIS — B02.9 HERPES ZOSTER WITHOUT COMPLICATION: Primary | ICD-10-CM

## 2018-10-08 RX ORDER — HYDROCODONE BITARTRATE AND ACETAMINOPHEN 5; 325 MG/1; MG/1
1 TABLET ORAL
Qty: 60 TAB | Refills: 0 | Status: SHIPPED | OUTPATIENT
Start: 2018-10-08 | End: 2021-02-23 | Stop reason: ALTCHOICE

## 2018-10-08 RX ORDER — DOXYCYCLINE 100 MG/1
CAPSULE ORAL
COMMUNITY
Start: 2018-10-06 | End: 2021-02-23 | Stop reason: ALTCHOICE

## 2018-10-08 RX ORDER — PREDNISONE 10 MG/1
TABLET ORAL
COMMUNITY
Start: 2018-10-06 | End: 2021-02-23 | Stop reason: RX

## 2018-10-08 RX ORDER — ACYCLOVIR 800 MG/1
TABLET ORAL
COMMUNITY
Start: 2018-10-06 | End: 2021-02-23 | Stop reason: ALTCHOICE

## 2018-10-08 RX ORDER — GABAPENTIN 300 MG/1
300 CAPSULE ORAL 2 TIMES DAILY
Qty: 60 CAP | Refills: 2 | Status: SHIPPED | OUTPATIENT
Start: 2018-10-08 | End: 2021-02-23 | Stop reason: ALTCHOICE

## 2018-10-08 NOTE — PROGRESS NOTES
1. Have you been to the ER, urgent care clinic since your last visit? Hospitalized since your last visit? Yes, [facility], 10/6/18    2. Have you seen or consulted any other health care providers outside of the [address] [place] [address] since your last visit? Include any pap smears or colon screening.  No     Chief Complaint   Patient presents with    Leg Pain     Left Thigh, x3 days

## 2018-10-08 NOTE — MR AVS SNAPSHOT
[address] [address] 
[phone] Patient: Sherlyn Long MRN:  [id number] Visit Information Date & Time Provider Department Dept. Phone Encounter #  
 10/8/2018  7:00 AM Cecilio Castellanos NP [address] [facility] [phone] [id number] Upcoming Health Maintenance Date Due DTaP/Tdap/Td series (1 - Tdap) 6/3/1991 PAP AKA CERVICAL CYTOLOGY 6/3/1991 Influenza Age 5 to Adult 8/1/2018 Allergies as of 10/8/2018  Review Complete On: 10/8/2018 By: Bruce Harrington LPN Severity Noted Reaction Type Reactions Pcn [Penicillins]  09/21/2017    Unknown (comments) Pt states reaction as toddler, does not know reaction Current Immunizations  Never Reviewed Name Date Influenza Vaccine 9/10/2017 Not reviewed this visit You Were Diagnosed With   
  
 Codes Comments Herpes zoster without complication    -  Primary ICD-10-CM: B02.9 ICD-9-CM: 511. 9 Vitals BP Pulse Temp Resp Height(growth percentile) Weight(growth percentile) (!) 146/91 (!) 101 99 °F (37.2 °C) (Oral) 16 5' 2\" (1.575 m) 141 lb (64 kg) LMP SpO2 BMI OB Status Smoking Status 09/18/2018 (Exact Date) 99% 25.79 kg/m2 Having regular periods Current Some Day Smoker Vitals History BMI and BSA Data Body Mass Index Body Surface Area 25.79 kg/m 2 1.67 m 2 Preferred Pharmacy Pharmacy Name Phone [facility] [address], [address]  [phone] Your Updated Medication List  
  
   
This list is accurate as of 10/8/18  7:24 AM.  Always use your most recent med list.  
  
  
  
  
 acyclovir 800 mg tablet Commonly known as:  ZOVIRAX AMBIEN 10 mg tablet Generic drug:  zolpidem Take  by mouth nightly as needed for Sleep. dextroamphetamine-amphetamine 10 mg tablet Commonly known as:  ADDERALL 30 mg daily. doxycycline 100 mg capsule Commonly known as:  VIBRAMYCIN  
  
 gabapentin 300 mg capsule Commonly known as:  NEURONTIN Take 1 Cap by mouth two (2) times a day. HYDROcodone-acetaminophen 5-325 mg per tablet Commonly known as:  [address] Take 1 Tab by mouth three (3) times daily as needed for Pain. Max Daily Amount: 3 Tabs. OXcarbazepine 300 mg tablet Commonly known as:  TRILEPTAL Take  by mouth three (3) times daily. oxyCODONE-acetaminophen 5-325 mg per tablet Commonly known as:  PERCOCET Take 1-2 Tabs by mouth every four (4) hours as needed for Pain. Max Daily Amount: 12 Tabs. predniSONE 10 mg tablet Commonly known as:  DELTASONE  
  
 XANAX 0.5 mg tablet Generic drug:  ALPRAZolam  
Take  by mouth two (2) times a day. Indications: prn  
  
  
  
  
Prescriptions Printed Refills HYDROcodone-acetaminophen (NORCO) 5-325 mg per tablet 0 Sig: Take 1 Tab by mouth three (3) times daily as needed for Pain. Max Daily Amount: 3 Tabs. Class: Print Route: Oral  
  
Prescriptions Sent to Pharmacy Refills  
 gabapentin (NEURONTIN) 300 mg capsule 2 Sig: Take 1 Cap by mouth two (2) times a day. Class: Normal  
 Pharmacy: [facility] [address] [facility] [address]  Ph #: [phone] Route: Oral  
  
Introducing [facility]! Dear Sheyla Patel: Thank you for requesting a [employer] account. Our records indicate that you already have an active [employer] account. You can access your account anytime at [email or web address] Did you know that you can access your hospital and ER discharge instructions at any time in [employer]? You can also review all of your test results from your hospital stay or ER visit. Additional Information If you have questions, please visit the Frequently Asked Questions section of the [employer] website at [email or web address] Remember, [employer] is NOT to be used for urgent needs. For medical emergencies, dial 911. Now available from your iPhone and Android! Please provide this summary of care documentation to your next provider. Your primary care clinician is listed as Shaw White. If you have any questions after today's visit, please call [phone].

## 2018-10-08 NOTE — PROGRESS NOTES
HISTORY OF PRESENT ILLNESS  Chalo Bean is a 50 y.o. female. HPI  [facility], dx with shingles of the left upper inner thigh  Pain is severe, 10/10 and \"worse than childbirth\"  Given acyclovir and pred pack  Can't even wear pants due to the severe intense pain  No s/s yellow crusting or infection    ROS  A comprehensive review of system was obtained and negative except findings in the HPI    Visit Vitals    BP (!) 146/91    Pulse (!) 101    Temp 99 °F (37.2 °C) (Oral)    Resp 16    Ht 5' 2\" (1.575 m)    Wt 141 lb (64 kg)    LMP 09/18/2018 (Exact Date)    SpO2 99%    BMI 25.79 kg/m2     Physical Exam   Constitutional: She appears well-developed and well-nourished. She appears distressed. Skin: Rash noted. There is erythema. Left inner thigh vesicular rash with blisters of the entire length of the inner leg, no s/s infection   Nursing note and vitals reviewed. ASSESSMENT and PLAN  Encounter Diagnoses   Name Primary?  Herpes zoster without complication Yes     Orders Placed This Encounter    doxycycline (VIBRAMYCIN) 100 mg capsule    predniSONE (DELTASONE) 10 mg tablet    acyclovir (ZOVIRAX) 800 mg tablet    gabapentin (NEURONTIN) 300 mg capsule    HYDROcodone-acetaminophen (NORCO) 5-325 mg per tablet     Given norco for pain TID prn  Start gabapentin 300mg bid, adr/se reviewed  Finish acyclovir and pred pack  Follow up prn  Reviewed tx course  I have discussed the diagnosis with the patient and the intended plan as seen in the above orders. The patient has received an after-visit summary and questions were answered concerning future plans. Patient conveyed understanding of the plan at the time of the visit.     Jacqui Nelson, MSN, ANP  10/8/2018

## 2020-10-28 LAB
CREATININE, EXTERNAL: 0.71
HBA1C MFR BLD HPLC: 5.6 %
LDL-C, EXTERNAL: 124

## 2021-02-23 ENCOUNTER — OFFICE VISIT (OUTPATIENT)
Dept: FAMILY MEDICINE CLINIC | Age: 51
End: 2021-02-23
Payer: COMMERCIAL

## 2021-02-23 VITALS
SYSTOLIC BLOOD PRESSURE: 138 MMHG | RESPIRATION RATE: 18 BRPM | OXYGEN SATURATION: 98 % | TEMPERATURE: 98.4 F | BODY MASS INDEX: 29.26 KG/M2 | HEART RATE: 100 BPM | HEIGHT: 62 IN | DIASTOLIC BLOOD PRESSURE: 87 MMHG | WEIGHT: 159 LBS

## 2021-02-23 DIAGNOSIS — M25.50 MULTIPLE JOINT PAIN: ICD-10-CM

## 2021-02-23 DIAGNOSIS — L98.8 SKIN PLAQUE: Primary | ICD-10-CM

## 2021-02-23 LAB — RHEUMATOID FACT SERPL-ACNC: <10 IU/ML

## 2021-02-23 PROCEDURE — 99214 OFFICE O/P EST MOD 30 MIN: CPT | Performed by: NURSE PRACTITIONER

## 2021-02-23 RX ORDER — PREDNISONE 10 MG/1
TABLET ORAL
Qty: 21 TAB | Refills: 0 | Status: SHIPPED | OUTPATIENT
Start: 2021-02-23 | End: 2021-05-24 | Stop reason: ALTCHOICE

## 2021-02-23 RX ORDER — OXCARBAZEPINE 300 MG/1
TABLET, FILM COATED ORAL
Qty: 90 TAB | Refills: 3 | Status: SHIPPED | OUTPATIENT
Start: 2021-02-23 | End: 2021-03-22 | Stop reason: SDUPTHER

## 2021-02-23 RX ORDER — SERTRALINE HYDROCHLORIDE 50 MG/1
50 TABLET, FILM COATED ORAL DAILY
Qty: 30 TAB | Refills: 5 | Status: SHIPPED | OUTPATIENT
Start: 2021-02-23 | End: 2021-03-22 | Stop reason: SDUPTHER

## 2021-02-23 RX ORDER — OXYBUTYNIN CHLORIDE 5 MG/1
5 TABLET, EXTENDED RELEASE ORAL DAILY
COMMUNITY
End: 2022-03-14 | Stop reason: ALTCHOICE

## 2021-02-23 RX ORDER — SERTRALINE HYDROCHLORIDE 50 MG/1
50 TABLET, FILM COATED ORAL DAILY
COMMUNITY
End: 2021-02-23 | Stop reason: SDUPTHER

## 2021-02-23 NOTE — PROGRESS NOTES
Chief Complaint   Patient presents with    Hand Pain    Labs     Pt in office today for hand pain  -pt states the pain is aching and feeling raw  -pt states that she has not taking anything  -labs  cholesterol     1. Have you been to the ER, urgent care clinic since your last visit? Hospitalized since your last visit? No    2. Have you seen or consulted any other health care providers outside of the [address] [place] [address] since your last visit? Include any pap smears or colon screening.  No     Pt has no other concerns

## 2021-02-23 NOTE — PROGRESS NOTES
HISTORY OF PRESENT ILLNESS  Gilda Connell is a 48 y.o. female. HPI  Pt in office today for hand pain  -pt states the pain is aching and feeling raw  -pt states that she has not taking anything  -labs - believes she has psoriatic arthritis  Palms of hands jaelyn blister and peel with severe redness and bleeding at times    Her psychiatrist retired and she needs someone to take over her meds for bipolar depression/anxiety  Med list has been entered into chart  Sx are currently stable and have been for years    ROS  A comprehensive review of system was obtained and negative except findings in the HPI    Visit Vitals  /87 (BP 1 Location: Right arm, BP Patient Position: Sitting)   Pulse 100   Temp 98.4 °F (36.9 °C) (Oral)   Resp 18   Ht 5' 2\" (1.575 m)   Wt 159 lb (72.1 kg)   LMP  (LMP Unknown)   SpO2 98%   BMI 29.08 kg/m²     Physical Exam  Vitals signs and nursing note reviewed. Skin:     General: Skin is warm and dry. Findings: Erythema and rash present. Comments: jaelyn palms with skin peeled off and red with scattered vesicular lesions         ASSESSMENT and PLAN  Encounter Diagnoses   Name Primary?  Skin plaque Yes    Multiple joint pain  Bipolar depression/anxiety      Orders Placed This Encounter    RHEUMATOID FACTOR, QT - [facility] Only    REFERRAL TO RHEUMATOLOGY    oxybutynin chloride XL (DITROPAN XL) 5 mg CR tablet    predniSONE (STERAPRED DS) 10 mg dose pack    sertraline (Zoloft) 50 mg tablet    OXcarbazepine (TRILEPTAL) 300 mg tablet     Will do RA workup with labs  Given pred pack  Will do noncontrolled meds for depression / anxiety until she can get us the records from psych    I have discussed the diagnosis with the patient and the intended plan as seen in the above orders. The patient has received an after-visit summary and questions were answered concerning future plans. Patient conveyed understanding of the plan at the time of the visit.     Lb Baer, MSN, ANP 2/23/2021

## 2021-02-24 NOTE — PROGRESS NOTES
Your rheumatoid lab is negative but go ahead and make an appt as discussed for eval by Rashmi Aguilar

## 2021-03-04 ENCOUNTER — DOCUMENTATION ONLY (OUTPATIENT)
Dept: FAMILY MEDICINE CLINIC | Age: 51
End: 2021-03-04

## 2021-03-04 NOTE — PROGRESS NOTES
Faxed 02/23/21 lab results to Dr Narciso Bose/Rheumatology per patient request. Fax #[phone] confirmation received.

## 2021-03-09 ENCOUNTER — DOCUMENTATION ONLY (OUTPATIENT)
Dept: FAMILY MEDICINE CLINIC | Age: 51
End: 2021-03-09

## 2021-03-22 DIAGNOSIS — G47.00 INSOMNIA, UNSPECIFIED TYPE: Primary | ICD-10-CM

## 2021-03-22 RX ORDER — ZOLPIDEM TARTRATE 10 MG/1
10 TABLET ORAL
Qty: 30 TAB | Refills: 2 | Status: SHIPPED | OUTPATIENT
Start: 2021-03-22 | End: 2021-08-30

## 2021-03-22 RX ORDER — OXCARBAZEPINE 300 MG/1
TABLET, FILM COATED ORAL
Qty: 90 TAB | Refills: 3 | Status: SHIPPED | OUTPATIENT
Start: 2021-03-22 | End: 2022-01-17

## 2021-03-22 RX ORDER — SERTRALINE HYDROCHLORIDE 50 MG/1
50 TABLET, FILM COATED ORAL DAILY
Qty: 30 TAB | Refills: 5 | Status: SHIPPED | OUTPATIENT
Start: 2021-03-22 | End: 2021-11-23 | Stop reason: SDUPTHER

## 2021-03-22 NOTE — TELEPHONE ENCOUNTER
Spoke with pt she needs refill of her medication. Informed that for adderall she will have to wait for her apt on Thursday.  She also wants to ensure that provider received her labs from [facility] on her thyroid as she wants to discuss this Thursday at her apt

## 2021-03-25 ENCOUNTER — OFFICE VISIT (OUTPATIENT)
Dept: FAMILY MEDICINE CLINIC | Age: 51
End: 2021-03-25

## 2021-03-25 VITALS
HEART RATE: 99 BPM | DIASTOLIC BLOOD PRESSURE: 86 MMHG | HEIGHT: 62 IN | SYSTOLIC BLOOD PRESSURE: 136 MMHG | TEMPERATURE: 97.8 F | BODY MASS INDEX: 28.71 KG/M2 | OXYGEN SATURATION: 97 % | RESPIRATION RATE: 14 BRPM | WEIGHT: 156 LBS

## 2021-03-25 DIAGNOSIS — L98.9 SKIN LESION: ICD-10-CM

## 2021-03-25 DIAGNOSIS — E03.9 HYPOTHYROIDISM, UNSPECIFIED TYPE: Primary | ICD-10-CM

## 2021-03-25 DIAGNOSIS — F98.8 ATTENTION DEFICIT DISORDER, UNSPECIFIED HYPERACTIVITY PRESENCE: ICD-10-CM

## 2021-03-25 DIAGNOSIS — Z11.1 SCREENING-PULMONARY TB: ICD-10-CM

## 2021-03-25 DIAGNOSIS — E78.00 HYPERCHOLESTEREMIA: ICD-10-CM

## 2021-03-25 PROCEDURE — 99214 OFFICE O/P EST MOD 30 MIN: CPT | Performed by: NURSE PRACTITIONER

## 2021-03-25 RX ORDER — DEXTROAMPHETAMINE SACCHARATE, AMPHETAMINE ASPARTATE, DEXTROAMPHETAMINE SULFATE AND AMPHETAMINE SULFATE 5; 5; 5; 5 MG/1; MG/1; MG/1; MG/1
20 TABLET ORAL 2 TIMES DAILY
Qty: 60 TAB | Refills: 0 | Status: SHIPPED | OUTPATIENT
Start: 2021-05-25 | End: 2021-05-24 | Stop reason: SDUPTHER

## 2021-03-25 RX ORDER — DEXTROAMPHETAMINE SACCHARATE, AMPHETAMINE ASPARTATE, DEXTROAMPHETAMINE SULFATE AND AMPHETAMINE SULFATE 5; 5; 5; 5 MG/1; MG/1; MG/1; MG/1
20 TABLET ORAL 2 TIMES DAILY
Qty: 60 TAB | Refills: 0 | Status: SHIPPED | OUTPATIENT
Start: 2021-03-25 | End: 2021-05-24 | Stop reason: SDUPTHER

## 2021-03-25 RX ORDER — DEXTROAMPHETAMINE SACCHARATE, AMPHETAMINE ASPARTATE, DEXTROAMPHETAMINE SULFATE AND AMPHETAMINE SULFATE 5; 5; 5; 5 MG/1; MG/1; MG/1; MG/1
20 TABLET ORAL 2 TIMES DAILY
Qty: 60 TAB | Refills: 0 | Status: SHIPPED | OUTPATIENT
Start: 2021-04-25 | End: 2021-05-24 | Stop reason: SDUPTHER

## 2021-03-25 NOTE — PROGRESS NOTES
Chief Complaint   Patient presents with    Labs     Pt being seen to discuss labs  -pt wants to know if her obgyn's office sent over labs    1. Have you been to the ER, urgent care clinic since your last visit? Hospitalized since your last visit? No    2. Have you seen or consulted any other health care providers outside of the [address] [place] [address] since your last visit? Include any pap smears or colon screening.  No     Pt has no other concerns

## 2021-03-25 NOTE — PROGRESS NOTES
HISTORY OF PRESENT ILLNESS  Rubin Jean is a 48 y.o. female. HPI  Received labs from [facility], showed abn chol and thyroid  Was not fasting for the labs  Also needs referral to derm, skin lesion noted of the left upper abd that seems to be getting larger  She also had her records sent over from psych  Needs to get refills of her ADD meds  Takes Adderall 20mg bid  Needs a quant gold test for her Rheum to start Humara    ROS  A comprehensive review of system was obtained and negative except findings in the HPI    Visit Vitals  /86 (BP 1 Location: Right arm, BP Patient Position: Sitting)   Pulse 99   Temp 97.8 °F (36.6 °C) (Oral)   Resp 14   Ht 5' 2\" (1.575 m)   Wt 156 lb (70.8 kg)   LMP 02/28/2021   SpO2 97%   BMI 28.53 kg/m²     Physical Exam  Vitals signs and nursing note reviewed. Constitutional:       Appearance: She is well-developed. Comments:      Neck:      Vascular: No JVD. Cardiovascular:      Rate and Rhythm: Normal rate and regular rhythm. Heart sounds: No murmur. No friction rub. No gallop. Pulmonary:      Effort: Pulmonary effort is normal. No respiratory distress. Breath sounds: Normal breath sounds. No wheezing. Skin:     General: Skin is warm. Neurological:      Mental Status: She is alert and oriented to person, place, and time. ASSESSMENT and PLAN  Encounter Diagnoses   Name Primary?     Hypothyroidism, unspecified type Yes    Hypercholesteremia     Screening-pulmonary TB     Attention deficit disorder, unspecified hyperactivity presence     Skin lesion      Orders Placed This Encounter    QUANTIFERON-TB GOLD PLUS    LIPID PANEL    TSH, 3RD GENERATION    T3 UPTAKE PROFILE    T4    REFERRAL TO DERMATOLOGY    dextroamphetamine-amphetamine (ADDERALL) 20 mg tablet    dextroamphetamine-amphetamine (ADDERALL) 20 mg tablet    dextroamphetamine-amphetamine (ADDERALL) 20 mg tablet     Given rx x 3 mo for adderall  Reviewed how to take and how to follow up for refills  Referral given to derm  Labs updated  Dev plan with results    I have discussed the diagnosis with the patient and the intended plan as seen in the above orders. The patient has received an after-visit summary and questions were answered concerning future plans. Patient conveyed understanding of the plan at the time of the visit.     Hernesto Wells, MSN, ANP  3/25/2021

## 2021-03-28 LAB
CHOLEST SERPL-MCNC: 255 MG/DL (ref 100–199)
FT4I SERPL CALC-MCNC: 0.9 (ref 1.2–4.9)
GAMMA INTERFERON BACKGROUND BLD IA-ACNC: 0.03 IU/ML
HDLC SERPL-MCNC: 72 MG/DL
IMP & REVIEW OF LAB RESULTS: NORMAL
LDLC SERPL CALC-MCNC: 159 MG/DL (ref 0–99)
M TB IFN-G BLD-IMP: NEGATIVE
M TB IFN-G CD4+ BCKGRND COR BLD-ACNC: 0.02 IU/ML
MITOGEN IGNF BLD-ACNC: >10 IU/ML
QUANTIFERON INCUBATION, QF1T: NORMAL
QUANTIFERON TB2 AG: 0.01 IU/ML
SERVICE CMNT-IMP: NORMAL
T3RU NFR SERPL: 26 % (ref 24–39)
T4 SERPL-MCNC: 3.6 UG/DL (ref 4.5–12)
TRIGL SERPL-MCNC: 138 MG/DL (ref 0–149)
TSH SERPL DL<=0.005 MIU/L-ACNC: 0.87 UIU/ML (ref 0.45–4.5)
VLDLC SERPL CALC-MCNC: 24 MG/DL (ref 5–40)

## 2021-03-29 NOTE — PROGRESS NOTES
Hey there, your labs overall are normal for thyroid and TB. Your cholesterol is pilar high. You need to start a cholesterol med like Crestor. I can send this in for you if you agree. Please let me know your thoughts.  Bjorn Murphy

## 2021-03-31 ENCOUNTER — TELEPHONE (OUTPATIENT)
Dept: FAMILY MEDICINE CLINIC | Age: 51
End: 2021-03-31

## 2021-03-31 RX ORDER — ROSUVASTATIN CALCIUM 10 MG/1
10 TABLET, COATED ORAL DAILY
Qty: 30 TAB | Refills: 5 | Status: SHIPPED | OUTPATIENT
Start: 2021-03-31 | End: 2021-10-10

## 2021-05-24 ENCOUNTER — OFFICE VISIT (OUTPATIENT)
Dept: FAMILY MEDICINE CLINIC | Age: 51
End: 2021-05-24
Payer: COMMERCIAL

## 2021-05-24 VITALS
BODY MASS INDEX: 29.08 KG/M2 | WEIGHT: 158 LBS | DIASTOLIC BLOOD PRESSURE: 78 MMHG | HEART RATE: 83 BPM | HEIGHT: 62 IN | RESPIRATION RATE: 14 BRPM | OXYGEN SATURATION: 97 % | TEMPERATURE: 98.4 F | SYSTOLIC BLOOD PRESSURE: 119 MMHG

## 2021-05-24 DIAGNOSIS — F98.8 ATTENTION DEFICIT DISORDER, UNSPECIFIED HYPERACTIVITY PRESENCE: ICD-10-CM

## 2021-05-24 DIAGNOSIS — Z51.81 MEDICATION MONITORING ENCOUNTER: Primary | ICD-10-CM

## 2021-05-24 LAB
AMPHETAMINE QL URINE POC: NORMAL
BARBITURATES UR POC: NEGATIVE
BENZODIAZEPINES UR POC: NEGATIVE
COCAINE QL URINE POC: NEGATIVE
LOT EXP DATE POC: NORMAL
LOT NUMBER POC: NORMAL
MARIJUANA (THC) QL URINE POC: NEGATIVE
MDMA/ECSTASY UR POC: NEGATIVE
METHADONE QL URINE POC: NEGATIVE
METHAMPHETAMINE QL URINE POC: NEGATIVE
NTI OTHER MICRO TRANSPORT 977598: NEGATIVE
OPIATES QL URINE POC: NEGATIVE
OXYCODONE UR POC: NEGATIVE
VALID INTERNAL CONTROL?: YES

## 2021-05-24 PROCEDURE — 99213 OFFICE O/P EST LOW 20 MIN: CPT | Performed by: NURSE PRACTITIONER

## 2021-05-24 PROCEDURE — 80305 DRUG TEST PRSMV DIR OPT OBS: CPT | Performed by: NURSE PRACTITIONER

## 2021-05-24 RX ORDER — DEXTROAMPHETAMINE SACCHARATE, AMPHETAMINE ASPARTATE, DEXTROAMPHETAMINE SULFATE AND AMPHETAMINE SULFATE 5; 5; 5; 5 MG/1; MG/1; MG/1; MG/1
20 TABLET ORAL 2 TIMES DAILY
Qty: 60 TABLET | Refills: 0 | Status: SHIPPED | OUTPATIENT
Start: 2021-07-25 | End: 2021-08-24 | Stop reason: SDUPTHER

## 2021-05-24 RX ORDER — DEXTROAMPHETAMINE SACCHARATE, AMPHETAMINE ASPARTATE, DEXTROAMPHETAMINE SULFATE AND AMPHETAMINE SULFATE 5; 5; 5; 5 MG/1; MG/1; MG/1; MG/1
20 TABLET ORAL 2 TIMES DAILY
Qty: 60 TABLET | Refills: 0 | Status: SHIPPED | OUTPATIENT
Start: 2021-08-25 | End: 2021-08-24 | Stop reason: SDUPTHER

## 2021-05-24 RX ORDER — DEXTROAMPHETAMINE SACCHARATE, AMPHETAMINE ASPARTATE, DEXTROAMPHETAMINE SULFATE AND AMPHETAMINE SULFATE 5; 5; 5; 5 MG/1; MG/1; MG/1; MG/1
20 TABLET ORAL 2 TIMES DAILY
Qty: 60 TABLET | Refills: 0 | Status: SHIPPED | OUTPATIENT
Start: 2021-06-25 | End: 2021-08-24 | Stop reason: SDUPTHER

## 2021-05-24 NOTE — PROGRESS NOTES
HISTORY OF PRESENT ILLNESS  Herb Tanner is a 48 y.o. female. HPI  Patient returns to office for follow up ADD. Stable on medication without weight loss, decreased appetite, insomnia, or tremor. Good focus control. Gets three months of scripts at a time. See med order for dose. She is also due to give UDS for schedule II med for insurance    ROS  A comprehensive review of system was obtained and negative except findings in the HPI    Visit Vitals  /78 (BP 1 Location: Left upper arm, BP Patient Position: Sitting)   Pulse 83   Temp 98.4 °F (36.9 °C) (Oral)   Resp 14   Ht 5' 2\" (1.575 m)   Wt 158 lb (71.7 kg)   LMP  (LMP Unknown)   SpO2 97%   BMI 28.90 kg/m²     Physical Exam  Vitals and nursing note reviewed. Constitutional:       Appearance: She is well-developed. Comments:      Neck:      Vascular: No JVD. Cardiovascular:      Rate and Rhythm: Normal rate and regular rhythm. Heart sounds: No murmur heard. No friction rub. No gallop. Pulmonary:      Effort: Pulmonary effort is normal. No respiratory distress. Breath sounds: Normal breath sounds. No wheezing. Skin:     General: Skin is warm. Neurological:      Mental Status: She is alert and oriented to person, place, and time. ASSESSMENT and PLAN  Encounter Diagnoses   Name Primary?  Medication monitoring encounter Yes    Attention deficit disorder, unspecified hyperactivity presence      Orders Placed This Encounter    AMB CHIQUI RIVAS ICUP DX DRUG SCREEN 10    dextroamphetamine-amphetamine (ADDERALL) 20 mg tablet     Sig: Take 1 Tablet by mouth two (2) times a day. Max Daily Amount: 40 mg. Dispense:  60 Tablet     Refill:  0    dextroamphetamine-amphetamine (ADDERALL) 20 mg tablet     Sig: Take 1 Tablet by mouth two (2) times a day. Max Daily Amount: 40 mg. Dispense:  60 Tablet     Refill:  0    dextroamphetamine-amphetamine (ADDERALL) 20 mg tablet     Sig: Take 1 Tablet by mouth two (2) times a day. Max Daily Amount: 40 mg. Dispense:  60 Tablet     Refill:  0     Follow up 3 mo    I have discussed the diagnosis with the patient and the intended plan as seen in the above orders. The patient has received an after-visit summary and questions were answered concerning future plans. Patient conveyed understanding of the plan at the time of the visit.     ADELA Mir, ANP  5/24/2021

## 2021-05-24 NOTE — PROGRESS NOTES
Chief Complaint   Patient presents with    Medication Refill     Pt being seen for med refill  Pt will be doing a urine drug screen for insurance purposes    1. Have you been to the ER, urgent care clinic since your last visit? Hospitalized since your last visit? No    2. Have you seen or consulted any other health care providers outside of the [address] [place] [address] since your last visit? Include any pap smears or colon screening.  No     Py has no other concerns

## 2021-08-24 ENCOUNTER — OFFICE VISIT (OUTPATIENT)
Dept: FAMILY MEDICINE CLINIC | Age: 51
End: 2021-08-24
Payer: COMMERCIAL

## 2021-08-24 VITALS
SYSTOLIC BLOOD PRESSURE: 146 MMHG | WEIGHT: 161 LBS | HEART RATE: 89 BPM | TEMPERATURE: 98.7 F | BODY MASS INDEX: 29.63 KG/M2 | RESPIRATION RATE: 18 BRPM | DIASTOLIC BLOOD PRESSURE: 81 MMHG | OXYGEN SATURATION: 96 % | HEIGHT: 62 IN

## 2021-08-24 DIAGNOSIS — F98.8 ATTENTION DEFICIT DISORDER, UNSPECIFIED HYPERACTIVITY PRESENCE: Primary | ICD-10-CM

## 2021-08-24 DIAGNOSIS — E78.00 HYPERCHOLESTEREMIA: ICD-10-CM

## 2021-08-24 PROCEDURE — 99213 OFFICE O/P EST LOW 20 MIN: CPT | Performed by: NURSE PRACTITIONER

## 2021-08-24 RX ORDER — DEXTROAMPHETAMINE SACCHARATE, AMPHETAMINE ASPARTATE, DEXTROAMPHETAMINE SULFATE AND AMPHETAMINE SULFATE 5; 5; 5; 5 MG/1; MG/1; MG/1; MG/1
20 TABLET ORAL 2 TIMES DAILY
Qty: 60 TABLET | Refills: 0 | Status: SHIPPED | OUTPATIENT
Start: 2021-08-24 | End: 2021-11-23 | Stop reason: SDUPTHER

## 2021-08-24 RX ORDER — DEXTROAMPHETAMINE SACCHARATE, AMPHETAMINE ASPARTATE, DEXTROAMPHETAMINE SULFATE AND AMPHETAMINE SULFATE 5; 5; 5; 5 MG/1; MG/1; MG/1; MG/1
20 TABLET ORAL 2 TIMES DAILY
Qty: 60 TABLET | Refills: 0 | Status: SHIPPED | OUTPATIENT
Start: 2021-08-25 | End: 2021-11-23 | Stop reason: SDUPTHER

## 2021-08-25 NOTE — PROGRESS NOTES
HISTORY OF PRESENT ILLNESS  Madelin Saint is a 46 y.o. female. HPI  Patient returns to office for follow up ADD. Stable on medication without weight loss, decreased appetite, insomnia, or tremor. Good focus control. Gets three months of scripts at a time. See med order for dose. She is also due for lab recheck for cholesterol  Started crestor last visit, no adr/se of med  No fasting for labs    ROS  A comprehensive review of system was obtained and negative except findings in the HPI    Visit Vitals  BP (!) 146/81   Pulse 89   Temp 98.7 °F (37.1 °C)   Resp 18   Ht 5' 2\" (1.575 m)   Wt 161 lb (73 kg)   SpO2 96%   BMI 29.45 kg/m²     Physical Exam  Vitals and nursing note reviewed. Constitutional:       Appearance: She is well-developed. Comments:      Neck:      Vascular: No JVD. Cardiovascular:      Rate and Rhythm: Normal rate and regular rhythm. Heart sounds: No murmur heard. No friction rub. No gallop. Pulmonary:      Effort: Pulmonary effort is normal. No respiratory distress. Breath sounds: Normal breath sounds. No wheezing. Skin:     General: Skin is warm. Neurological:      Mental Status: She is alert and oriented to person, place, and time. ASSESSMENT and PLAN  Encounter Diagnoses   Name Primary?  Attention deficit disorder, unspecified hyperactivity presence Yes    Hypercholesteremia      Orders Placed This Encounter    LIPID PANEL    dextroamphetamine-amphetamine (ADDERALL) 20 mg tablet    dextroamphetamine-amphetamine (ADDERALL) 20 mg tablet    dextroamphetamine-amphetamine (ADDERALL) 20 mg tablet     Refills updated today  Given lab slip to do cholesterol at [facility]    I have discussed the diagnosis with the patient and the intended plan as seen in the above orders. The patient has received an after-visit summary and questions were answered concerning future plans. Patient conveyed understanding of the plan at the time of the visit.     Pura ALDRICH Estefanía Garcia, MSN, ANP  8/24/2021

## 2021-08-30 DIAGNOSIS — G47.00 INSOMNIA, UNSPECIFIED TYPE: ICD-10-CM

## 2021-08-30 RX ORDER — ZOLPIDEM TARTRATE 10 MG/1
TABLET ORAL
Qty: 30 TABLET | Refills: 1 | Status: SHIPPED | OUTPATIENT
Start: 2021-08-30 | End: 2021-11-23 | Stop reason: SDUPTHER

## 2021-09-04 LAB
CHOLEST SERPL-MCNC: 209 MG/DL (ref 100–199)
HDLC SERPL-MCNC: 55 MG/DL
IMP & REVIEW OF LAB RESULTS: NORMAL
LDLC SERPL CALC-MCNC: 118 MG/DL (ref 0–99)
TRIGL SERPL-MCNC: 204 MG/DL (ref 0–149)
VLDLC SERPL CALC-MCNC: 36 MG/DL (ref 5–40)

## 2021-09-07 RX ORDER — CLOBETASOL PROPIONATE 0.5 MG/G
OINTMENT TOPICAL 2 TIMES DAILY
Qty: 60 G | Refills: 0 | Status: SHIPPED | OUTPATIENT
Start: 2021-09-07 | End: 2021-11-23 | Stop reason: SDUPTHER

## 2021-10-10 RX ORDER — ROSUVASTATIN CALCIUM 10 MG/1
TABLET, COATED ORAL
Qty: 30 TABLET | Refills: 5 | Status: SHIPPED | OUTPATIENT
Start: 2021-10-10 | End: 2021-11-23 | Stop reason: SDUPTHER

## 2021-11-22 ENCOUNTER — TELEPHONE (OUTPATIENT)
Dept: FAMILY MEDICINE CLINIC | Age: 51
End: 2021-11-22

## 2021-11-23 ENCOUNTER — OFFICE VISIT (OUTPATIENT)
Dept: FAMILY MEDICINE CLINIC | Age: 51
End: 2021-11-23
Payer: COMMERCIAL

## 2021-11-23 VITALS
OXYGEN SATURATION: 97 % | HEIGHT: 62 IN | HEART RATE: 100 BPM | BODY MASS INDEX: 28.16 KG/M2 | DIASTOLIC BLOOD PRESSURE: 89 MMHG | SYSTOLIC BLOOD PRESSURE: 136 MMHG | RESPIRATION RATE: 20 BRPM | TEMPERATURE: 98.6 F | WEIGHT: 153 LBS

## 2021-11-23 DIAGNOSIS — G47.00 INSOMNIA, UNSPECIFIED TYPE: ICD-10-CM

## 2021-11-23 DIAGNOSIS — F41.9 ANXIETY: ICD-10-CM

## 2021-11-23 DIAGNOSIS — E03.9 HYPOTHYROIDISM, UNSPECIFIED TYPE: ICD-10-CM

## 2021-11-23 DIAGNOSIS — F98.8 ATTENTION DEFICIT DISORDER, UNSPECIFIED HYPERACTIVITY PRESENCE: ICD-10-CM

## 2021-11-23 DIAGNOSIS — E78.00 HYPERCHOLESTEREMIA: Primary | ICD-10-CM

## 2021-11-23 PROCEDURE — 99213 OFFICE O/P EST LOW 20 MIN: CPT | Performed by: FAMILY MEDICINE

## 2021-11-23 RX ORDER — SERTRALINE HYDROCHLORIDE 50 MG/1
50 TABLET, FILM COATED ORAL DAILY
Qty: 30 TABLET | Refills: 5 | Status: SHIPPED | OUTPATIENT
Start: 2021-11-23 | End: 2022-03-14 | Stop reason: SDUPTHER

## 2021-11-23 RX ORDER — DEXTROAMPHETAMINE SACCHARATE, AMPHETAMINE ASPARTATE, DEXTROAMPHETAMINE SULFATE AND AMPHETAMINE SULFATE 5; 5; 5; 5 MG/1; MG/1; MG/1; MG/1
20 TABLET ORAL 2 TIMES DAILY
Qty: 60 TABLET | Refills: 0 | Status: SHIPPED | OUTPATIENT
Start: 2021-12-23 | End: 2022-01-22

## 2021-11-23 RX ORDER — DEXTROAMPHETAMINE SACCHARATE, AMPHETAMINE ASPARTATE, DEXTROAMPHETAMINE SULFATE AND AMPHETAMINE SULFATE 5; 5; 5; 5 MG/1; MG/1; MG/1; MG/1
20 TABLET ORAL 2 TIMES DAILY
Qty: 60 TABLET | Refills: 0 | Status: SHIPPED | OUTPATIENT
Start: 2021-11-23 | End: 2022-03-14 | Stop reason: SDUPTHER

## 2021-11-23 RX ORDER — DEXTROAMPHETAMINE SACCHARATE, AMPHETAMINE ASPARTATE, DEXTROAMPHETAMINE SULFATE AND AMPHETAMINE SULFATE 5; 5; 5; 5 MG/1; MG/1; MG/1; MG/1
20 TABLET ORAL 2 TIMES DAILY
Qty: 60 TABLET | Refills: 0 | Status: SHIPPED | OUTPATIENT
Start: 2022-01-23 | End: 2022-03-14 | Stop reason: SDUPTHER

## 2021-11-23 RX ORDER — ZOLPIDEM TARTRATE 10 MG/1
10 TABLET ORAL
Qty: 30 TABLET | Refills: 5 | Status: SHIPPED | OUTPATIENT
Start: 2021-11-23 | End: 2022-03-14 | Stop reason: SDUPTHER

## 2021-11-23 RX ORDER — ALPRAZOLAM 0.5 MG/1
0.5 TABLET ORAL 2 TIMES DAILY
Qty: 30 TABLET | Refills: 5 | Status: SHIPPED | OUTPATIENT
Start: 2021-11-23 | End: 2022-03-14 | Stop reason: SDUPTHER

## 2021-11-23 RX ORDER — ROSUVASTATIN CALCIUM 10 MG/1
10 TABLET, COATED ORAL DAILY
Qty: 90 TABLET | Refills: 1 | Status: SHIPPED | OUTPATIENT
Start: 2021-11-23 | End: 2022-03-14 | Stop reason: SDUPTHER

## 2021-11-23 RX ORDER — CLOBETASOL PROPIONATE 0.5 MG/G
OINTMENT TOPICAL 2 TIMES DAILY
Qty: 60 G | Refills: 0 | Status: SHIPPED | OUTPATIENT
Start: 2021-11-23 | End: 2022-03-14 | Stop reason: SDUPTHER

## 2021-11-23 NOTE — PROGRESS NOTES
Patient here for med refill. 1. Have you been to the ER, urgent care clinic since your last visit? Hospitalized since your last visit? No    2. Have you seen or consulted any other health care providers outside of the [address] [place] [address] since your last visit? Include any pap smears or colon screening. No          Chief Complaint   Patient presents with    Medication Refill     She is a 46 y.o. female who presents for evalution. Reviewed PmHx, RxHx, FmHx, SocHx, AllgHx and updated and dated in the chart. There are no problems to display for this patient. Review of Systems - negative except as listed above in the HPI    Objective:     Vitals:    11/23/21 0748   BP: 136/89   Pulse: 100   Resp: 20   Temp: 98.6 °F (37 °C)   SpO2: 97%   Weight: 153 lb (69.4 kg)   Height: 5' 2\" (1.575 m)         Assessment/ Plan:   Diagnoses and all orders for this visit:    1. Hypercholesteremia  -gokul rx and nonfasting today    2. Insomnia, unspecified type  -     zolpidem (AMBIEN) 10 mg tablet; Take 1 Tablet by mouth nightly. 3. Hypothyroidism, unspecified type  -at goal  -labs next OV    4. Attention deficit disorder, unspecified hyperactivity presence  -     dextroamphetamine-amphetamine (ADDERALL) 20 mg tablet; Take 1 Tablet by mouth two (2) times a day for 30 days. Max Daily Amount: 40 mg.  -     dextroamphetamine-amphetamine (ADDERALL) 20 mg tablet; Take 1 Tablet by mouth two (2) times a day for 30 days. Max Daily Amount: 40 mg.  -     dextroamphetamine-amphetamine (ADDERALL) 20 mg tablet; Take 1 Tablet by mouth two (2) times a day for 30 days. Max Daily Amount: 40 mg.    5. Anxiety  -     ALPRAZolam (Xanax) 0.5 mg tablet; Take 1 Tablet by mouth two (2) times a day. Max Daily Amount: 1 mg. Indications: prn    Other orders  -     rosuvastatin (CRESTOR) 10 mg tablet; Take 1 Tablet by mouth daily. -     clobetasoL (TEMOVATE) 0.05 % ointment; Apply  to affected area two (2) times a day.   -     sertraline (Zoloft) 50 mg tablet; Take 1 Tablet by mouth daily. Follow-up and Dispositions    · Return in about 3 months (around 2/23/2022). I have discussed the diagnosis with the patient and the intended plan as seen in the above orders. The patient understands and agrees with the plan. The patient has received an after-visit summary and questions were answered concerning future plans. Medication Side Effects and Warnings were discussed with patient  Patient Labs were reviewed and or requested:  Patient Past Records were reviewed and or requested    Enid Prakash M.D. There are no Patient Instructions on file for this visit. no

## 2021-12-20 ENCOUNTER — TELEPHONE (OUTPATIENT)
Dept: FAMILY MEDICINE CLINIC | Age: 51
End: 2021-12-20

## 2021-12-20 DIAGNOSIS — M25.50 MULTIPLE JOINT PAIN: Primary | ICD-10-CM

## 2021-12-20 NOTE — TELEPHONE ENCOUNTER
----- Message from Dorcus Comp sent at 12/20/2021  4:45 PM EST -----  Subject: Message to Provider    QUESTIONS  Information for Provider? Patient states that Dr. Macie Echevarria, her   rheumatologist, closed down his practice less than a month ago. Patient   needs a new rheumatologist referral. Please do this as soon as possible. Thank you.   ---------------------------------------------------------------------------  --------------  CALL BACK INFO  What is the best way for the office to contact you? OK to leave message on   voicemail  Preferred Call Back Phone Number? [phone]  ---------------------------------------------------------------------------  --------------  SCRIPT ANSWERS  Relationship to Patient?  Self

## 2021-12-21 NOTE — TELEPHONE ENCOUNTER
We can not write this med. She will have to see new Rheum and they will have to fill it.  Jeannette Soliman

## 2022-01-17 RX ORDER — OXCARBAZEPINE 300 MG/1
TABLET, FILM COATED ORAL
Qty: 90 TABLET | Refills: 3 | Status: SHIPPED | OUTPATIENT
Start: 2022-01-17 | End: 2022-03-14 | Stop reason: SDUPTHER

## 2022-03-14 ENCOUNTER — OFFICE VISIT (OUTPATIENT)
Dept: FAMILY MEDICINE CLINIC | Age: 52
End: 2022-03-14
Payer: COMMERCIAL

## 2022-03-14 VITALS
WEIGHT: 161 LBS | RESPIRATION RATE: 18 BRPM | DIASTOLIC BLOOD PRESSURE: 89 MMHG | TEMPERATURE: 98 F | BODY MASS INDEX: 29.63 KG/M2 | OXYGEN SATURATION: 96 % | HEIGHT: 62 IN | SYSTOLIC BLOOD PRESSURE: 139 MMHG | HEART RATE: 90 BPM

## 2022-03-14 DIAGNOSIS — F41.9 ANXIETY: ICD-10-CM

## 2022-03-14 DIAGNOSIS — F98.8 ATTENTION DEFICIT DISORDER, UNSPECIFIED HYPERACTIVITY PRESENCE: ICD-10-CM

## 2022-03-14 DIAGNOSIS — E03.9 HYPOTHYROIDISM, UNSPECIFIED TYPE: ICD-10-CM

## 2022-03-14 DIAGNOSIS — G47.00 INSOMNIA, UNSPECIFIED TYPE: ICD-10-CM

## 2022-03-14 DIAGNOSIS — E78.00 HYPERCHOLESTEREMIA: Primary | ICD-10-CM

## 2022-03-14 DIAGNOSIS — L40.50 PSORIATIC ARTHRITIS (HCC): ICD-10-CM

## 2022-03-14 PROCEDURE — 99214 OFFICE O/P EST MOD 30 MIN: CPT | Performed by: FAMILY MEDICINE

## 2022-03-14 RX ORDER — DEXTROAMPHETAMINE SACCHARATE, AMPHETAMINE ASPARTATE, DEXTROAMPHETAMINE SULFATE AND AMPHETAMINE SULFATE 5; 5; 5; 5 MG/1; MG/1; MG/1; MG/1
20 TABLET ORAL 2 TIMES DAILY
Qty: 60 TABLET | Refills: 0 | Status: SHIPPED | OUTPATIENT
Start: 2022-03-14 | End: 2022-10-06 | Stop reason: SDUPTHER

## 2022-03-14 RX ORDER — DEXTROAMPHETAMINE SACCHARATE, AMPHETAMINE ASPARTATE, DEXTROAMPHETAMINE SULFATE AND AMPHETAMINE SULFATE 5; 5; 5; 5 MG/1; MG/1; MG/1; MG/1
20 TABLET ORAL 2 TIMES DAILY
COMMUNITY
End: 2022-03-14 | Stop reason: SDUPTHER

## 2022-03-14 RX ORDER — DEXTROAMPHETAMINE SACCHARATE, AMPHETAMINE ASPARTATE, DEXTROAMPHETAMINE SULFATE AND AMPHETAMINE SULFATE 5; 5; 5; 5 MG/1; MG/1; MG/1; MG/1
20 TABLET ORAL 2 TIMES DAILY
Qty: 60 TABLET | Refills: 0 | Status: SHIPPED | OUTPATIENT
Start: 2022-04-14 | End: 2022-05-14

## 2022-03-14 RX ORDER — OXCARBAZEPINE 300 MG/1
TABLET, FILM COATED ORAL
Qty: 90 TABLET | Refills: 3 | Status: SHIPPED | OUTPATIENT
Start: 2022-03-14

## 2022-03-14 RX ORDER — ROSUVASTATIN CALCIUM 10 MG/1
10 TABLET, COATED ORAL DAILY
Qty: 90 TABLET | Refills: 1 | Status: SHIPPED | OUTPATIENT
Start: 2022-03-14

## 2022-03-14 RX ORDER — ZOLPIDEM TARTRATE 10 MG/1
10 TABLET ORAL
Qty: 30 TABLET | Refills: 5 | Status: SHIPPED | OUTPATIENT
Start: 2022-03-14 | End: 2022-10-26

## 2022-03-14 RX ORDER — CLOBETASOL PROPIONATE 0.5 MG/G
OINTMENT TOPICAL 2 TIMES DAILY
Qty: 60 G | Refills: 0 | Status: SHIPPED | OUTPATIENT
Start: 2022-03-14 | End: 2022-06-27

## 2022-03-14 RX ORDER — SERTRALINE HYDROCHLORIDE 50 MG/1
50 TABLET, FILM COATED ORAL DAILY
Qty: 30 TABLET | Refills: 5 | Status: SHIPPED | OUTPATIENT
Start: 2022-03-14

## 2022-03-14 RX ORDER — ALPRAZOLAM 0.5 MG/1
0.5 TABLET ORAL 2 TIMES DAILY
Qty: 30 TABLET | Refills: 5 | Status: SHIPPED | OUTPATIENT
Start: 2022-03-14 | End: 2022-09-14

## 2022-03-14 RX ORDER — DEXTROAMPHETAMINE SACCHARATE, AMPHETAMINE ASPARTATE, DEXTROAMPHETAMINE SULFATE AND AMPHETAMINE SULFATE 5; 5; 5; 5 MG/1; MG/1; MG/1; MG/1
20 TABLET ORAL 2 TIMES DAILY
Qty: 60 TABLET | Refills: 0 | Status: SHIPPED | OUTPATIENT
Start: 2022-05-14 | End: 2022-06-13

## 2022-03-14 NOTE — PROGRESS NOTES
Patient here for 3 month f/u med refill. 1. Have you been to the ER, urgent care clinic since your last visit? Hospitalized since your last visit? No    2. Have you seen or consulted any other health care providers outside of the [address] [place] [address] since your last visit? Include any pap smears or colon screening. No              No chief complaint on file. She is a 46 y.o. female who presents for evalution. Reviewed PmHx, RxHx, FmHx, SocHx, AllgHx and updated and dated in the chart. Patient Active Problem List    Diagnosis    Psoriatic arthritis ([facility] [address] [address]    Attention deficit disorder    Hypothyroidism    Insomnia    Anxiety    Hypercholesteremia       Review of Systems - negative except as listed above in the HPI    Objective:     Vitals:    03/14/22 1427   BP: 139/89   Pulse: 90   Resp: 18   Temp: 98 °F (36.7 °C)   SpO2: 96%   Weight: 161 lb (73 kg)   Height: 5' 2\" (1.575 m)         Assessment/ Plan:   Diagnoses and all orders for this visit:    1. Hypercholesteremia  -     rosuvastatin (CRESTOR) 10 mg tablet; Take 1 Tablet by mouth daily.  -     HEMOGLOBIN A1C WITH EAG; Future  -     LIPID PANEL; Future  -     METABOLIC PANEL, COMPREHENSIVE; Future  -gokul rx    2. Anxiety  -     ALPRAZolam (Xanax) 0.5 mg tablet; Take 1 Tablet by mouth two (2) times a day. Max Daily Amount: 1 mg. Indications: prn    3. Insomnia, unspecified type  -     zolpidem (AMBIEN) 10 mg tablet; Take 1 Tablet by mouth nightly. 4. Hypothyroidism, unspecified type  -     CBC WITH AUTOMATED DIFF; Future  -     TSH 3RD GENERATION; Future    5. Attention deficit disorder, unspecified hyperactivity presence  -     dextroamphetamine-amphetamine (AdderalL) 20 mg tablet; Take 1 Tablet by mouth two (2) times a day. Max Daily Amount: 40 mg.  -     dextroamphetamine-amphetamine (ADDERALL) 20 mg tablet; Take 1 Tablet by mouth two (2) times a day for 30 days.  Max Daily Amount: 40 mg.  -     dextroamphetamine-amphetamine (ADDERALL) 20 mg tablet; Take 1 Tablet by mouth two (2) times a day for 30 days. Max Daily Amount: 40 mg.    6. Psoriatic arthritis (HCC)  -Harper Grullon ongoing and tolerating    Other orders  -     OXcarbazepine (TRILEPTAL) 300 mg tablet; TAKE ONE TABLET BY MOUTH IN THE MORNING AND TAKE TWO TABLETS IN THE EVENING  -     clobetasoL (TEMOVATE) 0.05 % ointment; Apply  to affected area two (2) times a day. -     sertraline (Zoloft) 50 mg tablet; Take 1 Tablet by mouth daily. Follow-up and Dispositions    · Return in about 3 months (around 6/14/2022). I have discussed the diagnosis with the patient and the intended plan as seen in the above orders. The patient understands and agrees with the plan. The patient has received an after-visit summary and questions were answered concerning future plans. Medication Side Effects and Warnings were discussed with patient  Patient Labs were reviewed and or requested:  Patient Past Records were reviewed and or requested    Janki Jiang M.D. There are no Patient Instructions on file for this visit.

## 2022-03-15 LAB
ALBUMIN SERPL-MCNC: 4.6 G/DL (ref 3.8–4.9)
ALBUMIN/GLOB SERPL: 1.8 {RATIO} (ref 1.2–2.2)
ALP SERPL-CCNC: 85 IU/L (ref 44–121)
ALT SERPL-CCNC: 17 IU/L (ref 0–32)
AST SERPL-CCNC: 20 IU/L (ref 0–40)
BASOPHILS # BLD AUTO: 0 X10E3/UL (ref 0–0.2)
BASOPHILS NFR BLD AUTO: 1 %
BILIRUB SERPL-MCNC: <0.2 MG/DL (ref 0–1.2)
BUN SERPL-MCNC: 11 MG/DL (ref 6–24)
BUN/CREAT SERPL: 15 (ref 9–23)
CALCIUM SERPL-MCNC: 9.1 MG/DL (ref 8.7–10.2)
CHLORIDE SERPL-SCNC: 101 MMOL/L (ref 96–106)
CHOLEST SERPL-MCNC: 220 MG/DL (ref 100–199)
CO2 SERPL-SCNC: 22 MMOL/L (ref 20–29)
CREAT SERPL-MCNC: 0.73 MG/DL (ref 0.57–1)
EGFR: 100 ML/MIN/1.73
EOSINOPHIL # BLD AUTO: 0.1 X10E3/UL (ref 0–0.4)
EOSINOPHIL NFR BLD AUTO: 2 %
ERYTHROCYTE [DISTWIDTH] IN BLOOD BY AUTOMATED COUNT: 12.6 % (ref 11.7–15.4)
EST. AVERAGE GLUCOSE BLD GHB EST-MCNC: 117 MG/DL
GLOBULIN SER CALC-MCNC: 2.6 G/DL (ref 1.5–4.5)
GLUCOSE SERPL-MCNC: 86 MG/DL (ref 65–99)
HBA1C MFR BLD: 5.7 % (ref 4.8–5.6)
HCT VFR BLD AUTO: 42.6 % (ref 34–46.6)
HDLC SERPL-MCNC: 53 MG/DL
HGB BLD-MCNC: 14.5 G/DL (ref 11.1–15.9)
IMM GRANULOCYTES # BLD AUTO: 0 X10E3/UL (ref 0–0.1)
IMM GRANULOCYTES NFR BLD AUTO: 0 %
IMP & REVIEW OF LAB RESULTS: NORMAL
INTERPRETATION: NORMAL
LDLC SERPL CALC-MCNC: 121 MG/DL (ref 0–99)
LYMPHOCYTES # BLD AUTO: 1.9 X10E3/UL (ref 0.7–3.1)
LYMPHOCYTES NFR BLD AUTO: 26 %
MCH RBC QN AUTO: 32.3 PG (ref 26.6–33)
MCHC RBC AUTO-ENTMCNC: 34 G/DL (ref 31.5–35.7)
MCV RBC AUTO: 95 FL (ref 79–97)
MONOCYTES # BLD AUTO: 0.6 X10E3/UL (ref 0.1–0.9)
MONOCYTES NFR BLD AUTO: 8 %
NEUTROPHILS # BLD AUTO: 4.7 X10E3/UL (ref 1.4–7)
NEUTROPHILS NFR BLD AUTO: 63 %
PLATELET # BLD AUTO: 312 X10E3/UL (ref 150–450)
POTASSIUM SERPL-SCNC: 4.3 MMOL/L (ref 3.5–5.2)
PROT SERPL-MCNC: 7.2 G/DL (ref 6–8.5)
RBC # BLD AUTO: 4.49 X10E6/UL (ref 3.77–5.28)
SODIUM SERPL-SCNC: 138 MMOL/L (ref 134–144)
TRIGL SERPL-MCNC: 265 MG/DL (ref 0–149)
TSH SERPL DL<=0.005 MIU/L-ACNC: 1.47 UIU/ML (ref 0.45–4.5)
VLDLC SERPL CALC-MCNC: 46 MG/DL (ref 5–40)
WBC # BLD AUTO: 7.4 X10E3/UL (ref 3.4–10.8)

## 2022-03-18 PROBLEM — L40.50 PSORIATIC ARTHRITIS (HCC): Status: ACTIVE | Noted: 2022-03-14

## 2022-03-19 PROBLEM — F41.9 ANXIETY: Status: ACTIVE | Noted: 2022-03-14

## 2022-03-19 PROBLEM — G47.00 INSOMNIA: Status: ACTIVE | Noted: 2022-03-14

## 2022-03-19 PROBLEM — E03.9 HYPOTHYROIDISM: Status: ACTIVE | Noted: 2022-03-14

## 2022-03-19 PROBLEM — F98.8 ATTENTION DEFICIT DISORDER: Status: ACTIVE | Noted: 2022-03-14

## 2022-03-19 PROBLEM — E78.00 HYPERCHOLESTEREMIA: Status: ACTIVE | Noted: 2022-03-14

## 2022-06-27 RX ORDER — CLOBETASOL PROPIONATE 0.5 MG/G
OINTMENT TOPICAL
Qty: 60 G | Refills: 0 | Status: SHIPPED | OUTPATIENT
Start: 2022-06-27

## 2022-09-14 DIAGNOSIS — F41.9 ANXIETY: ICD-10-CM

## 2022-09-14 RX ORDER — ALPRAZOLAM 0.5 MG/1
TABLET ORAL
Qty: 30 TABLET | Refills: 1 | Status: SHIPPED | OUTPATIENT
Start: 2022-09-14

## 2022-09-29 ENCOUNTER — DOCUMENTATION ONLY (OUTPATIENT)
Dept: FAMILY MEDICINE CLINIC | Age: 52
End: 2022-09-29

## 2022-10-06 ENCOUNTER — OFFICE VISIT (OUTPATIENT)
Dept: FAMILY MEDICINE CLINIC | Age: 52
End: 2022-10-06
Payer: COMMERCIAL

## 2022-10-06 VITALS
HEART RATE: 96 BPM | SYSTOLIC BLOOD PRESSURE: 142 MMHG | DIASTOLIC BLOOD PRESSURE: 88 MMHG | RESPIRATION RATE: 18 BRPM | OXYGEN SATURATION: 98 % | HEIGHT: 62 IN | BODY MASS INDEX: 30 KG/M2 | TEMPERATURE: 98.8 F | WEIGHT: 163 LBS

## 2022-10-06 DIAGNOSIS — F98.8 ATTENTION DEFICIT DISORDER, UNSPECIFIED HYPERACTIVITY PRESENCE: ICD-10-CM

## 2022-10-06 DIAGNOSIS — G56.01 CARPAL TUNNEL SYNDROME OF RIGHT WRIST: Primary | ICD-10-CM

## 2022-10-06 DIAGNOSIS — Z01.818 PREOP EXAMINATION: ICD-10-CM

## 2022-10-06 PROCEDURE — 99214 OFFICE O/P EST MOD 30 MIN: CPT | Performed by: NURSE PRACTITIONER

## 2022-10-06 RX ORDER — DEXTROAMPHETAMINE SACCHARATE, AMPHETAMINE ASPARTATE, DEXTROAMPHETAMINE SULFATE AND AMPHETAMINE SULFATE 5; 5; 5; 5 MG/1; MG/1; MG/1; MG/1
20 TABLET ORAL 2 TIMES DAILY
Qty: 60 TABLET | Refills: 0 | Status: SHIPPED | OUTPATIENT
Start: 2022-10-06

## 2022-10-06 NOTE — PROGRESS NOTES
Patient here for carpal tunnel release by Suzanna Douglass. 1. Have you been to the ER, urgent care clinic since your last visit? Hospitalized since your last visit? No    2. Have you seen or consulted any other health care providers outside of the [address] [place] [address] since your last visit? Include any pap smears or colon screening.  No

## 2022-10-06 NOTE — PROGRESS NOTES
Preoperative Evaluation    Date of Exam: 10/6/2022    Laura French is a 46 y.o. female (:1970) who presents for preoperative evaluation. She is having CTS right hand on Oct 11, 2022 with Dr. Brenden Bartlett. Latex Allergy: no    Problem List:     Patient Active Problem List    Diagnosis Date Noted    Psoriatic arthritis ([facility] [address] 2022    Attention deficit disorder 2022    Hypothyroidism 2022    Insomnia 2022    Anxiety 2022    Hypercholesteremia 2022     Medical History:     Past Medical History:   Diagnosis Date    Anxiety     Depression     Hair loss     Poor appetite      Allergies: Allergies   Allergen Reactions    Pcn [Penicillins] Unknown (comments)     Pt states reaction as toddler, does not know reaction      Medications:     Current Outpatient Medications   Medication Sig    ALPRAZolam (XANAX) 0.5 mg tablet TAKE ONE TABLET BY MOUTH TWICE A DAY AS NEEDED    clobetasoL (TEMOVATE) 0.05 % ointment APPLY TO AFFECTED AREA(S) TWO TIMES A DAY    OXcarbazepine (TRILEPTAL) 300 mg tablet TAKE ONE TABLET BY MOUTH IN THE MORNING AND TAKE TWO TABLETS IN THE EVENING    rosuvastatin (CRESTOR) 10 mg tablet Take 1 Tablet by mouth daily. zolpidem (AMBIEN) 10 mg tablet Take 1 Tablet by mouth nightly. sertraline (Zoloft) 50 mg tablet Take 1 Tablet by mouth daily. dextroamphetamine-amphetamine (AdderalL) 20 mg tablet Take 1 Tablet by mouth two (2) times a day. Max Daily Amount: 40 mg. No current facility-administered medications for this visit. Surgical History:     Past Surgical History:   Procedure Laterality Date    HX CYST INCISION AND DRAINAGE  10/06/2017    Dr. Roberto Posey [facility]     HX GYN      2 C-sections     Social History:     Social History     Socioeconomic History    Marital status:    Tobacco Use    Smoking status: Some Days    Smokeless tobacco: Never   Substance and Sexual Activity    Alcohol use:  Yes     Alcohol/week: 10.0 standard drinks     Types: 10 Glasses of wine per week     Comment: Weekly    Drug use: No    Sexual activity: Yes     Partners: Male       Anesthesia Complications: None  History of abnormal bleeding : None  History of Blood Transfusions: no  Health Care Directive or Living Will: no    Objective:     ROS:   Feeling well. No dyspnea or chest pain on exertion. No abdominal pain, change in bowel habits, black or bloody stools. No urinary tract symptoms. No neurological complaints. OBJECTIVE:   The patient appears well, alert, oriented x 3, in no distress. Visit Vitals  BP (!) 142/88   Pulse 96   Temp 98.8 °F (37.1 °C)   Resp 18   Ht 5' 2\" (1.575 m)   Wt 163 lb (73.9 kg)   LMP 05/01/2022 Comment: patient states last cycle was in May 2022   SpO2 98%   BMI 29.81 kg/m²     HEENT:ENT normal.  Neck supple. No adenopathy or thyromegaly. ROSY. Chest: Lungs are clear, good air entry, no wheezes, rhonchi or rales. Cardiovascular: S1 and S2 normal, no murmurs, regular rate and rhythm. Abdomen: soft without tenderness, guarding, mass or organomegaly. Extremities: show no edema, normal peripheral pulses. Neurological: is normal, no focal findings.     IMPRESSION:   Low risk for planned surgery  No contraindications to planned surgery    Ankur Vaughn NP   10/6/2022

## 2022-10-10 ENCOUNTER — TELEPHONE (OUTPATIENT)
Dept: FAMILY MEDICINE CLINIC | Age: 52
End: 2022-10-10

## 2022-10-10 NOTE — TELEPHONE ENCOUNTER
Judith/Dr Bates Course with [facility] requesting Pre Op Form. Patient was seen 10.06.2022. Judith's phone: [phone] Fax: [phone].  Patient's surgery is [phone]

## 2022-10-26 DIAGNOSIS — G47.00 INSOMNIA, UNSPECIFIED TYPE: ICD-10-CM

## 2022-10-26 RX ORDER — ZOLPIDEM TARTRATE 10 MG/1
TABLET ORAL
Qty: 30 TABLET | Refills: 5 | Status: SHIPPED | OUTPATIENT
Start: 2022-10-26

## 2022-11-13 DIAGNOSIS — F41.9 ANXIETY: ICD-10-CM

## 2022-11-14 RX ORDER — ALPRAZOLAM 0.5 MG/1
TABLET ORAL
Qty: 30 TABLET | Refills: 0 | Status: SHIPPED | OUTPATIENT
Start: 2022-11-14

## 2022-12-10 DIAGNOSIS — E78.00 HYPERCHOLESTEREMIA: ICD-10-CM

## 2022-12-11 DIAGNOSIS — F41.9 ANXIETY: ICD-10-CM

## 2022-12-12 RX ORDER — OXCARBAZEPINE 300 MG/1
TABLET, FILM COATED ORAL
Qty: 90 TABLET | Refills: 3 | Status: SHIPPED | OUTPATIENT
Start: 2022-12-12

## 2022-12-12 RX ORDER — ROSUVASTATIN CALCIUM 10 MG/1
TABLET, COATED ORAL
Qty: 90 TABLET | Refills: 1 | Status: SHIPPED | OUTPATIENT
Start: 2022-12-12

## 2022-12-12 RX ORDER — ALPRAZOLAM 0.5 MG/1
TABLET ORAL
Qty: 30 TABLET | Refills: 2 | Status: SHIPPED | OUTPATIENT
Start: 2022-12-12

## 2023-01-23 RX ORDER — SERTRALINE HYDROCHLORIDE 50 MG/1
TABLET, FILM COATED ORAL
Qty: 30 TABLET | Refills: 5 | Status: SHIPPED | OUTPATIENT
Start: 2023-01-23

## 2023-02-13 LAB — MAMMOGRAPHY, EXTERNAL: NORMAL

## 2023-02-14 ENCOUNTER — OFFICE VISIT (OUTPATIENT)
Dept: FAMILY MEDICINE CLINIC | Age: 53
End: 2023-02-14
Payer: COMMERCIAL

## 2023-02-14 VITALS
TEMPERATURE: 98.4 F | OXYGEN SATURATION: 98 % | WEIGHT: 161 LBS | HEIGHT: 62 IN | RESPIRATION RATE: 16 BRPM | DIASTOLIC BLOOD PRESSURE: 94 MMHG | SYSTOLIC BLOOD PRESSURE: 150 MMHG | BODY MASS INDEX: 29.63 KG/M2 | HEART RATE: 79 BPM

## 2023-02-14 DIAGNOSIS — F98.8 ATTENTION DEFICIT DISORDER, UNSPECIFIED HYPERACTIVITY PRESENCE: ICD-10-CM

## 2023-02-14 DIAGNOSIS — G47.00 INSOMNIA, UNSPECIFIED TYPE: ICD-10-CM

## 2023-02-14 DIAGNOSIS — R03.0 ELEVATED BLOOD PRESSURE READING: Primary | ICD-10-CM

## 2023-02-14 PROCEDURE — 99214 OFFICE O/P EST MOD 30 MIN: CPT | Performed by: FAMILY MEDICINE

## 2023-02-14 RX ORDER — LISINOPRIL 10 MG/1
10 TABLET ORAL DAILY
Qty: 30 TABLET | Refills: 1 | Status: SHIPPED | OUTPATIENT
Start: 2023-02-14

## 2023-02-14 RX ORDER — SECUKINUMAB 150 MG/ML
150 INJECTION SUBCUTANEOUS EVERY 2 WEEKS
COMMUNITY
Start: 2023-02-13

## 2023-02-14 RX ORDER — DEXTROAMPHETAMINE SACCHARATE, AMPHETAMINE ASPARTATE, DEXTROAMPHETAMINE SULFATE AND AMPHETAMINE SULFATE 5; 5; 5; 5 MG/1; MG/1; MG/1; MG/1
20 TABLET ORAL 2 TIMES DAILY
Qty: 60 TABLET | Refills: 0 | Status: SHIPPED | OUTPATIENT
Start: 2023-02-14

## 2023-02-14 RX ORDER — DEXTROAMPHETAMINE SACCHARATE, AMPHETAMINE ASPARTATE, DEXTROAMPHETAMINE SULFATE AND AMPHETAMINE SULFATE 5; 5; 5; 5 MG/1; MG/1; MG/1; MG/1
20 TABLET ORAL 2 TIMES DAILY
Qty: 60 TABLET | Refills: 0 | Status: SHIPPED | OUTPATIENT
Start: 2023-04-13 | End: 2023-05-13

## 2023-02-14 RX ORDER — DEXTROAMPHETAMINE SACCHARATE, AMPHETAMINE ASPARTATE, DEXTROAMPHETAMINE SULFATE AND AMPHETAMINE SULFATE 5; 5; 5; 5 MG/1; MG/1; MG/1; MG/1
20 TABLET ORAL 2 TIMES DAILY
Qty: 60 TABLET | Refills: 0 | Status: SHIPPED | OUTPATIENT
Start: 2023-03-13 | End: 2023-04-12

## 2023-02-14 RX ORDER — ZOLPIDEM TARTRATE 10 MG/1
10 TABLET ORAL
Qty: 30 TABLET | Refills: 5 | Status: SHIPPED | OUTPATIENT
Start: 2023-02-14

## 2023-02-14 NOTE — PROGRESS NOTES
Chief Complaint   Patient presents with    Attention Deficit Disorder    Medication Refill    Arthritis     Rheumatology: DR Deisi Guerrero     1. Have you been to the ER, urgent care clinic since your last visit? Hospitalized since your last visit? No    2. Have you seen or consulted any other health care providers outside of the [address] [place] [address] since your last visit? Include any pap smears or colon screening.  Rheumatology: DR Deisi Guerrero

## 2023-02-14 NOTE — PROGRESS NOTES
Chief Complaint   Patient presents with    Attention Deficit Disorder    Medication Refill    Arthritis     Rheumatology: DR Dana Rubi     1. Have you been to the ER, urgent care clinic since your last visit? Hospitalized since your last visit? No    2. Have you seen or consulted any other health care providers outside of the [address] [place] [address] since your last visit? Include any pap smears or colon screening. Rheumatology: DR Dnaa Rubi         Chief Complaint   Patient presents with    Attention Deficit Disorder    Medication Refill    Arthritis     Rheumatology: DR Dana Rubi     She is a 46 y.o. female who presents for evalution. Reviewed PmHx, RxHx, FmHx, SocHx, AllgHx and updated and dated in the chart. Patient Active Problem List    Diagnosis    Psoriatic arthritis ([facility] [address]    Attention deficit disorder    Hypothyroidism    Insomnia    Anxiety    Hypercholesteremia       Review of Systems - negative except as listed above in the HPI    Objective:     Vitals:    02/14/23 1506   BP: (!) 150/94   Pulse: 79   Resp: 16   Temp: 98.4 °F (36.9 °C)   TempSrc: Oral   SpO2: 98%   Weight: 161 lb (73 kg)   Height: 5' 2\" (1.575 m)         Assessment/ Plan:   Diagnoses and all orders for this visit:    1. Elevated blood pressure reading  -     lisinopriL (PRINIVIL, ZESTRIL) 10 mg tablet; Take 1 Tablet by mouth daily. Increased blood pressure more than 1 occasion. Start medication recheck in 1 month. Check labs that time. 2. Attention deficit disorder, unspecified hyperactivity presence  -     dextroamphetamine-amphetamine (ADDERALL) 20 mg tablet; Take 1 Tablet by mouth two (2) times a day for 30 days. Max Daily Amount: 40 mg.  -     dextroamphetamine-amphetamine (ADDERALL) 20 mg tablet; Take 1 Tablet by mouth two (2) times a day for 30 days. Max Daily Amount: 40 mg.  -     dextroamphetamine-amphetamine (AdderalL) 20 mg tablet; Take 1 Tablet by mouth two (2) times a day.  Max Daily Amount: 40 mg.  Stable current meds  3. Insomnia, unspecified type  -     zolpidem (AMBIEN) 10 mg tablet; Take 1 Tablet by mouth nightly. Follow-up and Dispositions    Return in about 1 month (around 3/14/2023). I have discussed the diagnosis with the patient and the intended plan as seen in the above orders. The patient understands and agrees with the plan. The patient has received an after-visit summary and questions were answered concerning future plans. Medication Side Effects and Warnings were discussed with patient  Patient Labs were reviewed and or requested:  Patient Past Records were reviewed and or requested    Miranda Chavis M.D. There are no Patient Instructions on file for this visit.

## 2023-05-22 RX ORDER — OXCARBAZEPINE 300 MG/1
TABLET, FILM COATED ORAL
Qty: 90 TABLET | Refills: 1 | Status: SHIPPED | OUTPATIENT
Start: 2023-05-22

## 2023-06-08 RX ORDER — LISINOPRIL 10 MG/1
TABLET ORAL
Qty: 90 TABLET | Refills: 0 | Status: SHIPPED | OUTPATIENT
Start: 2023-06-08

## 2023-06-09 RX ORDER — ROSUVASTATIN CALCIUM 10 MG/1
TABLET, COATED ORAL
Qty: 90 TABLET | Refills: 1 | Status: SHIPPED | OUTPATIENT
Start: 2023-06-09

## 2023-07-19 RX ORDER — OXCARBAZEPINE 300 MG/1
TABLET, FILM COATED ORAL
Qty: 90 TABLET | Refills: 1 | Status: SHIPPED | OUTPATIENT
Start: 2023-07-19

## 2023-08-16 RX ORDER — ZOLPIDEM TARTRATE 10 MG/1
TABLET ORAL
Qty: 30 TABLET | OUTPATIENT
Start: 2023-08-16

## 2023-08-17 RX ORDER — ZOLPIDEM TARTRATE 10 MG/1
TABLET ORAL
Qty: 30 TABLET | OUTPATIENT
Start: 2023-08-17

## 2023-09-05 RX ORDER — LISINOPRIL 10 MG/1
TABLET ORAL
Qty: 90 TABLET | Refills: 0 | OUTPATIENT
Start: 2023-09-05

## 2023-09-14 ENCOUNTER — TELEPHONE (OUTPATIENT)
Age: 53
End: 2023-09-14

## 2023-09-14 RX ORDER — LISINOPRIL 10 MG/1
10 TABLET ORAL DAILY
Qty: 90 TABLET | Refills: 0 | Status: SHIPPED | OUTPATIENT
Start: 2023-09-14

## 2023-09-14 NOTE — TELEPHONE ENCOUNTER
Krystin Del Toro needs a refill of lisinopril (PRINIVIL;ZESTRIL) 10 MG tablet. They have 4 pills/supply left and are requesting a 30 day supply with refills.  &  zolpidem (AMBIEN) 10 MG tablet  Pharmacy has been updated in the chart. Patient was advised or scheduled an appointment for the future and to request refills thru the [facility] or by requesting a refill from their pharmacy in the future. Patient was also advised to check with their pharmacy for status of when refills are available.     Pt made an appt on 09/21/23 VV

## 2023-09-14 NOTE — TELEPHONE ENCOUNTER
----- Message from Madison Vilchis sent at 9/13/2023  2:50 PM EDT -----  Subject: Appointment Request    Reason for Call: Established Patient Appointment needed: Routine Existing   Condition Follow Up    QUESTIONS    Reason for appointment request? No appointments available during search     Additional Information for Provider? pt needs a med check  she would   like to see if Lawrence Villalobos has an appt sooner  She needs in person   ---------------------------------------------------------------------------  --------------  [address]  [phone]; OK to leave message on voicemail  ---------------------------------------------------------------------------  --------------  SCRIPT ANSWERS

## 2023-09-16 DIAGNOSIS — F51.01 PRIMARY INSOMNIA: Primary | ICD-10-CM

## 2023-09-18 RX ORDER — OXCARBAZEPINE 300 MG/1
TABLET, FILM COATED ORAL
Qty: 90 TABLET | Refills: 1 | Status: SHIPPED | OUTPATIENT
Start: 2023-09-18 | End: 2023-09-21 | Stop reason: SDUPTHER

## 2023-09-18 RX ORDER — ZOLPIDEM TARTRATE 10 MG/1
10 TABLET ORAL NIGHTLY PRN
Qty: 30 TABLET | Refills: 2 | Status: SHIPPED | OUTPATIENT
Start: 2023-09-18 | End: 2023-09-21 | Stop reason: SDUPTHER

## 2023-09-21 ENCOUNTER — TELEMEDICINE (OUTPATIENT)
Age: 53
End: 2023-09-21
Payer: COMMERCIAL

## 2023-09-21 DIAGNOSIS — R03.0 ELEVATED BLOOD-PRESSURE READING, WITHOUT DIAGNOSIS OF HYPERTENSION: ICD-10-CM

## 2023-09-21 DIAGNOSIS — F51.01 PRIMARY INSOMNIA: ICD-10-CM

## 2023-09-21 DIAGNOSIS — E78.00 HYPERCHOLESTEREMIA: ICD-10-CM

## 2023-09-21 DIAGNOSIS — F41.9 ANXIETY: ICD-10-CM

## 2023-09-21 DIAGNOSIS — E03.9 ACQUIRED HYPOTHYROIDISM: ICD-10-CM

## 2023-09-21 DIAGNOSIS — F90.2 ATTENTION DEFICIT HYPERACTIVITY DISORDER (ADHD), COMBINED TYPE: Primary | ICD-10-CM

## 2023-09-21 PROCEDURE — 99214 OFFICE O/P EST MOD 30 MIN: CPT | Performed by: FAMILY MEDICINE

## 2023-09-21 RX ORDER — DEXTROAMPHETAMINE SACCHARATE, AMPHETAMINE ASPARTATE, DEXTROAMPHETAMINE SULFATE AND AMPHETAMINE SULFATE 5; 5; 5; 5 MG/1; MG/1; MG/1; MG/1
20 TABLET ORAL 2 TIMES DAILY
Qty: 60 TABLET | Refills: 0 | Status: SHIPPED | OUTPATIENT
Start: 2023-09-21 | End: 2023-12-20

## 2023-09-21 RX ORDER — OXCARBAZEPINE 300 MG/1
TABLET, FILM COATED ORAL
Qty: 90 TABLET | Refills: 5 | Status: SHIPPED | OUTPATIENT
Start: 2023-09-21

## 2023-09-21 RX ORDER — LISINOPRIL 10 MG/1
10 TABLET ORAL DAILY
Qty: 90 TABLET | Refills: 1 | Status: SHIPPED | OUTPATIENT
Start: 2023-09-21

## 2023-09-21 RX ORDER — ZOLPIDEM TARTRATE 10 MG/1
10 TABLET ORAL NIGHTLY PRN
Qty: 30 TABLET | Refills: 2 | Status: SHIPPED | OUTPATIENT
Start: 2023-09-21 | End: 2024-09-20

## 2023-09-21 ASSESSMENT — PATIENT HEALTH QUESTIONNAIRE - PHQ9
SUM OF ALL RESPONSES TO PHQ QUESTIONS 1-9: 0
SUM OF ALL RESPONSES TO PHQ9 QUESTIONS 1 & 2: 0
2. FEELING DOWN, DEPRESSED OR HOPELESS: 0
SUM OF ALL RESPONSES TO PHQ QUESTIONS 1-9: 0
1. LITTLE INTEREST OR PLEASURE IN DOING THINGS: 0

## 2023-09-21 NOTE — PROGRESS NOTES
(and/or legal guardian's) consent. Patient identification was verified, and a caregiver was present when appropriate. The patient was located at Home: [facility] [id number]  Provider was located at [facility] ([address] [facility]): Brandi Jha MD on 9/21/2023 at 2:49 PM    An electronic signature was used to authenticate this note. I have discussed the diagnosis with the patient and the intended plan as seen in the above orders. The patient understands and agrees with the plan. The patient has received an after-visit summary and questions were answered concerning future plans. Medication Side Effects and Warnings were discussed with patient  Patient Labs were reviewed and or requested:  Patient Past Records were reviewed and or requested    Cindy Mendez M.D. There are no Patient Instructions on file for this visit.

## 2023-11-22 ENCOUNTER — TELEPHONE (OUTPATIENT)
Age: 53
End: 2023-11-22

## 2023-11-22 DIAGNOSIS — F90.2 ATTENTION DEFICIT HYPERACTIVITY DISORDER (ADHD), COMBINED TYPE: ICD-10-CM

## 2023-11-22 NOTE — TELEPHONE ENCOUNTER
Krystin Del Toro needs a refill of amphetamine-dextroamphetamine (ADDERALL) 20 MG tablet. They have 5 pills/supply left and are requesting a 30 day supply with refills. Pharmacy has been updated in the chart. Patient was advised or scheduled an appointment for the future and to request refills thru the [facility] or by requesting a refill from their pharmacy in the future. Patient was also advised to check with their pharmacy for status of when refills are available. Pt made an apt with you for 12.28.23.

## 2023-11-25 RX ORDER — DEXTROAMPHETAMINE SACCHARATE, AMPHETAMINE ASPARTATE, DEXTROAMPHETAMINE SULFATE AND AMPHETAMINE SULFATE 5; 5; 5; 5 MG/1; MG/1; MG/1; MG/1
20 TABLET ORAL 2 TIMES DAILY
Qty: 60 TABLET | Refills: 0 | Status: SHIPPED | OUTPATIENT
Start: 2023-11-25 | End: 2024-02-23

## 2023-12-28 ENCOUNTER — OFFICE VISIT (OUTPATIENT)
Age: 53
End: 2023-12-28
Payer: COMMERCIAL

## 2023-12-28 VITALS
WEIGHT: 149 LBS | RESPIRATION RATE: 14 BRPM | TEMPERATURE: 98.2 F | DIASTOLIC BLOOD PRESSURE: 80 MMHG | HEART RATE: 86 BPM | BODY MASS INDEX: 27.42 KG/M2 | OXYGEN SATURATION: 95 % | HEIGHT: 62 IN | SYSTOLIC BLOOD PRESSURE: 117 MMHG

## 2023-12-28 DIAGNOSIS — F41.9 ANXIETY: ICD-10-CM

## 2023-12-28 DIAGNOSIS — F51.01 PRIMARY INSOMNIA: ICD-10-CM

## 2023-12-28 DIAGNOSIS — Z12.11 SCREENING FOR MALIGNANT NEOPLASM OF COLON: ICD-10-CM

## 2023-12-28 DIAGNOSIS — Z00.00 WELL EXAM WITHOUT ABNORMAL FINDINGS OF PATIENT 18 YEARS OF AGE OR OLDER: Primary | ICD-10-CM

## 2023-12-28 DIAGNOSIS — Z12.31 SCREENING MAMMOGRAM FOR BREAST CANCER: ICD-10-CM

## 2023-12-28 DIAGNOSIS — F90.2 ATTENTION DEFICIT HYPERACTIVITY DISORDER (ADHD), COMBINED TYPE: ICD-10-CM

## 2023-12-28 PROCEDURE — 99396 PREV VISIT EST AGE 40-64: CPT | Performed by: NURSE PRACTITIONER

## 2023-12-28 RX ORDER — DEXTROAMPHETAMINE SACCHARATE, AMPHETAMINE ASPARTATE, DEXTROAMPHETAMINE SULFATE AND AMPHETAMINE SULFATE 5; 5; 5; 5 MG/1; MG/1; MG/1; MG/1
20 TABLET ORAL 2 TIMES DAILY
Qty: 60 TABLET | Refills: 0 | Status: SHIPPED | OUTPATIENT
Start: 2023-12-28 | End: 2024-01-27

## 2023-12-28 RX ORDER — ZOLPIDEM TARTRATE 10 MG/1
10 TABLET ORAL NIGHTLY PRN
Qty: 30 TABLET | Refills: 2 | Status: SHIPPED | OUTPATIENT
Start: 2023-12-28 | End: 2024-12-27

## 2023-12-28 RX ORDER — ALPRAZOLAM 0.5 MG/1
TABLET ORAL
Qty: 60 TABLET | Refills: 2 | Status: SHIPPED | OUTPATIENT
Start: 2023-12-28 | End: 2024-01-25

## 2023-12-28 NOTE — PROGRESS NOTES
2023    Krystin Del Toro (:  1970) is a 48 y.o. female, here for a preventive medicine evaluation. Patient Active Problem List   Diagnosis    Psoriatic arthritis ([address])    Hypercholesteremia    Attention deficit disorder    Insomnia    Anxiety    Hypothyroidism     She is also here today for fasting labs  Due for refills of meds for sleep, ADD and anxiety  Feeling good overall    Review of Systems  A comprehensive review of system was obtained and negative except findings in the HPI    Prior to Visit Medications    Medication Sig Taking? Authorizing Provider   clobetasol (TEMOVATE) 0.05 % ointment APPLY TO AFFECTED AREA(S) TWO TIMES A DAY Yes Katharine Faith MD   sertraline (ZOLOFT) 50 MG tablet Take 1 tablet by mouth daily Yes Katharine Faith MD   lisinopril (PRINIVIL;ZESTRIL) 10 MG tablet Take 1 tablet by mouth daily Yes Katharine Faith MD   OXcarbazepine (TRILEPTAL) 300 MG tablet TAKE ONE TABLET BY MOUTH EVERY MORNING AND TAKE TWO TABLETS EVERY EVENING Yes Katharine Faith MD   secukinumab (COSENTYX SENSOREADY PEN) 150 MG/ML SOAJ Inject into the skin Yes Automatic Reconciliation, Ar   ALPRAZolam (XANAX) 0.5 MG tablet TAKE ONE TABLET BY MOUTH TWICE A DAY AS NEEDED Yes Berna Ocampo APRN - NP   amphetamine-dextroamphetamine (ADDERALL) 20 MG tablet Take 1 tablet by mouth 2 times daily for 30 days. Max Daily Amount: 40 mg Yes Berna Vázquez APRN - NP   amphetamine-dextroamphetamine (ADDERALL, 20MG,) 20 MG tablet Take 1 tablet by mouth 2 times daily for 30 days. Max Daily Amount: 40 mg Yes Berna Vázquez, APRN - NP   amphetamine-dextroamphetamine (ADDERALL, 20MG,) 20 MG tablet Take 1 tablet by mouth 2 times daily for 30 days. Max Daily Amount: 40 mg Yes DAVID Arauz - RAMIREZ   zolpidem (AMBIEN) 10 MG tablet Take 1 tablet by mouth nightly as needed for Sleep.  Max Daily Amount: 10 mg Yes Shantelalessandro Monroy APRN - NP        Allergies   Allergen Reactions    Crestor [Rosuvastatin]

## 2023-12-28 NOTE — PROGRESS NOTES
Chief Complaint   Patient presents with    Anxiety    ADD    Medication Refill    Lab Collection     Fasting today     1. Have you been to the ER, urgent care clinic since your last visit? Hospitalized since your last visit? Yes Where: [facility]    2. Have you seen or consulted any other health care providers outside of the [address] [place] [address] Avenue since your last visit? Include any pap smears or colon screening.  Yes Where: Rheumatology : Dr Rohan Kamara

## 2023-12-29 LAB
ALBUMIN SERPL-MCNC: 4.6 G/DL (ref 3.8–4.9)
ALBUMIN/GLOB SERPL: 1.8 {RATIO} (ref 1.2–2.2)
ALP SERPL-CCNC: 79 IU/L (ref 44–121)
ALT SERPL-CCNC: 17 IU/L (ref 0–32)
AST SERPL-CCNC: 17 IU/L (ref 0–40)
BASOPHILS # BLD AUTO: 0 X10E3/UL (ref 0–0.2)
BASOPHILS NFR BLD AUTO: 1 %
BILIRUB SERPL-MCNC: <0.2 MG/DL (ref 0–1.2)
BUN SERPL-MCNC: 11 MG/DL (ref 6–24)
BUN/CREAT SERPL: 17 (ref 9–23)
CALCIUM SERPL-MCNC: 9.7 MG/DL (ref 8.7–10.2)
CHLORIDE SERPL-SCNC: 103 MMOL/L (ref 96–106)
CHOLEST SERPL-MCNC: 274 MG/DL (ref 100–199)
CO2 SERPL-SCNC: 24 MMOL/L (ref 20–29)
CREAT SERPL-MCNC: 0.63 MG/DL (ref 0.57–1)
EGFRCR SERPLBLD CKD-EPI 2021: 106 ML/MIN/1.73
EOSINOPHIL # BLD AUTO: 0.2 X10E3/UL (ref 0–0.4)
EOSINOPHIL NFR BLD AUTO: 3 %
ERYTHROCYTE [DISTWIDTH] IN BLOOD BY AUTOMATED COUNT: 12.8 % (ref 11.7–15.4)
GLOBULIN SER CALC-MCNC: 2.6 G/DL (ref 1.5–4.5)
GLUCOSE SERPL-MCNC: 95 MG/DL (ref 70–99)
HCT VFR BLD AUTO: 42.9 % (ref 34–46.6)
HDLC SERPL-MCNC: 63 MG/DL
HGB BLD-MCNC: 13.9 G/DL (ref 11.1–15.9)
IMM GRANULOCYTES # BLD AUTO: 0.1 X10E3/UL (ref 0–0.1)
IMM GRANULOCYTES NFR BLD AUTO: 1 %
IMP & REVIEW OF LAB RESULTS: NORMAL
LDLC SERPL CALC-MCNC: 188 MG/DL (ref 0–99)
LYMPHOCYTES # BLD AUTO: 1.7 X10E3/UL (ref 0.7–3.1)
LYMPHOCYTES NFR BLD AUTO: 32 %
MCH RBC QN AUTO: 30.2 PG (ref 26.6–33)
MCHC RBC AUTO-ENTMCNC: 32.4 G/DL (ref 31.5–35.7)
MCV RBC AUTO: 93 FL (ref 79–97)
MONOCYTES # BLD AUTO: 0.4 X10E3/UL (ref 0.1–0.9)
MONOCYTES NFR BLD AUTO: 8 %
NEUTROPHILS # BLD AUTO: 2.9 X10E3/UL (ref 1.4–7)
NEUTROPHILS NFR BLD AUTO: 55 %
PLATELET # BLD AUTO: 425 X10E3/UL (ref 150–450)
POTASSIUM SERPL-SCNC: 4.6 MMOL/L (ref 3.5–5.2)
PROT SERPL-MCNC: 7.2 G/DL (ref 6–8.5)
RBC # BLD AUTO: 4.6 X10E6/UL (ref 3.77–5.28)
SODIUM SERPL-SCNC: 141 MMOL/L (ref 134–144)
TRIGL SERPL-MCNC: 130 MG/DL (ref 0–149)
TSH SERPL DL<=0.005 MIU/L-ACNC: 0.82 UIU/ML (ref 0.45–4.5)
VLDLC SERPL CALC-MCNC: 23 MG/DL (ref 5–40)
WBC # BLD AUTO: 5.3 X10E3/UL (ref 3.4–10.8)

## 2024-01-07 RX ORDER — ROSUVASTATIN CALCIUM 5 MG/1
5 TABLET, COATED ORAL NIGHTLY
Qty: 30 TABLET | Refills: 3 | Status: SHIPPED | OUTPATIENT
Start: 2024-01-07

## 2024-03-18 SDOH — ECONOMIC STABILITY: TRANSPORTATION INSECURITY
IN THE PAST 12 MONTHS, HAS LACK OF TRANSPORTATION KEPT YOU FROM MEETINGS, WORK, OR FROM GETTING THINGS NEEDED FOR DAILY LIVING?: NO

## 2024-03-18 SDOH — ECONOMIC STABILITY: INCOME INSECURITY: HOW HARD IS IT FOR YOU TO PAY FOR THE VERY BASICS LIKE FOOD, HOUSING, MEDICAL CARE, AND HEATING?: NOT HARD AT ALL

## 2024-03-18 SDOH — ECONOMIC STABILITY: HOUSING INSECURITY
IN THE LAST 12 MONTHS, WAS THERE A TIME WHEN YOU DID NOT HAVE A STEADY PLACE TO SLEEP OR SLEPT IN A SHELTER (INCLUDING NOW)?: NO

## 2024-03-18 SDOH — ECONOMIC STABILITY: FOOD INSECURITY: WITHIN THE PAST 12 MONTHS, THE FOOD YOU BOUGHT JUST DIDN'T LAST AND YOU DIDN'T HAVE MONEY TO GET MORE.: NEVER TRUE

## 2024-03-18 SDOH — ECONOMIC STABILITY: FOOD INSECURITY: WITHIN THE PAST 12 MONTHS, YOU WORRIED THAT YOUR FOOD WOULD RUN OUT BEFORE YOU GOT MONEY TO BUY MORE.: NEVER TRUE

## 2024-03-21 ENCOUNTER — OFFICE VISIT (OUTPATIENT)
Age: 54
End: 2024-03-21
Payer: COMMERCIAL

## 2024-03-21 VITALS
DIASTOLIC BLOOD PRESSURE: 88 MMHG | HEIGHT: 62 IN | TEMPERATURE: 98.4 F | OXYGEN SATURATION: 98 % | RESPIRATION RATE: 16 BRPM | SYSTOLIC BLOOD PRESSURE: 127 MMHG | WEIGHT: 154.4 LBS | HEART RATE: 83 BPM | BODY MASS INDEX: 28.41 KG/M2

## 2024-03-21 DIAGNOSIS — E78.00 HYPERCHOLESTEREMIA: Primary | ICD-10-CM

## 2024-03-21 DIAGNOSIS — F90.2 ATTENTION DEFICIT HYPERACTIVITY DISORDER (ADHD), COMBINED TYPE: ICD-10-CM

## 2024-03-21 PROCEDURE — 99214 OFFICE O/P EST MOD 30 MIN: CPT | Performed by: NURSE PRACTITIONER

## 2024-03-21 RX ORDER — DEXTROAMPHETAMINE SACCHARATE, AMPHETAMINE ASPARTATE, DEXTROAMPHETAMINE SULFATE AND AMPHETAMINE SULFATE 5; 5; 5; 5 MG/1; MG/1; MG/1; MG/1
20 TABLET ORAL 2 TIMES DAILY
Qty: 60 TABLET | Refills: 0 | Status: SHIPPED | OUTPATIENT
Start: 2024-03-21 | End: 2024-04-20

## 2024-03-21 RX ORDER — CETIRIZINE HYDROCHLORIDE 10 MG/1
10 TABLET ORAL DAILY
COMMUNITY

## 2024-03-21 RX ORDER — ALPRAZOLAM 0.5 MG/1
TABLET ORAL
COMMUNITY
Start: 2024-03-06

## 2024-03-21 SDOH — ECONOMIC STABILITY: FOOD INSECURITY: WITHIN THE PAST 12 MONTHS, THE FOOD YOU BOUGHT JUST DIDN'T LAST AND YOU DIDN'T HAVE MONEY TO GET MORE.: NEVER TRUE

## 2024-03-21 SDOH — ECONOMIC STABILITY: FOOD INSECURITY: WITHIN THE PAST 12 MONTHS, YOU WORRIED THAT YOUR FOOD WOULD RUN OUT BEFORE YOU GOT MONEY TO BUY MORE.: NEVER TRUE

## 2024-03-21 SDOH — ECONOMIC STABILITY: INCOME INSECURITY: HOW HARD IS IT FOR YOU TO PAY FOR THE VERY BASICS LIKE FOOD, HOUSING, MEDICAL CARE, AND HEATING?: NOT HARD AT ALL

## 2024-03-21 ASSESSMENT — PATIENT HEALTH QUESTIONNAIRE - PHQ9
SUM OF ALL RESPONSES TO PHQ QUESTIONS 1-9: 0
2. FEELING DOWN, DEPRESSED OR HOPELESS: NOT AT ALL
1. LITTLE INTEREST OR PLEASURE IN DOING THINGS: NOT AT ALL
SUM OF ALL RESPONSES TO PHQ9 QUESTIONS 1 & 2: 0

## 2024-03-21 NOTE — PROGRESS NOTES
Chief Complaint   Patient presents with    Medication Refill    Follow-up     Patient is in the office today for a f/u and med refill.   Patient need refills on all meds.  Patient states she has a sore throat, states it's been for 2 days.  No other concerns.    \"Have you been to the ER, urgent care clinic since your last visit?  Hospitalized since your last visit?\" NO       “Have you seen or consulted any other health care providers outside of [facility] since your last visit?”    NO    Have you had a mammogram?”   NO    Date of last Mammogram: 12/28/2020         “Have you had a colorectal cancer screening such as a colonoscopy/FIT/Cologuard?    NO    No colonoscopy on file  No cologuard on file  No FIT/FOBT on file   No flexible sigmoidoscopy on file         Click Here for Release of Records Request

## 2024-03-21 NOTE — PROGRESS NOTES
Subjective:      Patient ID: Krystin Del Toro is a 53 y.o. female.    Medication Refill    Patient returns to office for follow up ADD.  Stable on medication without weight loss, decreased appetite, insomnia, or tremor.  Good focus control.  Gets three months of scripts at a time.  See med order for dose.    She is here for follow up hypercholesterolemia  Started Crestor 5mg a day  No adr/se of med noted  She is down 7 pounds with diet changes    Wants advice on allergy season  Takes zyrtec daily    Review of Systems  A comprehensive review of system was obtained and negative except findings in the HPI    /88 (Site: Right Upper Arm, Position: Sitting)   Pulse 83   Temp 98.4 °F (36.9 °C) (Oral)   Resp 16   Ht 1.575 m (5' 2\")   Wt 70 kg (154 lb 6.4 oz)   SpO2 98%   BMI 28.24 kg/m²   Objective:   Physical Exam  Vitals and nursing note reviewed.   Constitutional:       General: She is not in acute distress.     Appearance: Normal appearance.   Cardiovascular:      Rate and Rhythm: Normal rate and regular rhythm.   Pulmonary:      Effort: Pulmonary effort is normal. No respiratory distress.      Breath sounds: Normal breath sounds. No wheezing or rhonchi.   Musculoskeletal:         General: No swelling.   Neurological:      General: No focal deficit present.      Mental Status: She is alert and oriented to person, place, and time.   Psychiatric:         Mood and Affect: Mood normal.         Assessment / Plan:   Krystin was seen today for medication refill and follow-up.    Diagnoses and all orders for this visit:    Hypercholesteremia  -     Lipid Panel; Future  -     Lipid Panel    Attention deficit hyperactivity disorder (ADHD), combined type  -     amphetamine-dextroamphetamine (ADDERALL, 20MG,) 20 MG tablet; Take 1 tablet by mouth 2 times daily for 30 days. Max Daily Amount: 40 mg  -     amphetamine-dextroamphetamine (ADDERALL, 20MG,) 20 MG tablet; Take 1 tablet by mouth 2 times daily for 30 days. Max

## 2024-03-22 LAB
CHOLEST SERPL-MCNC: 205 MG/DL (ref 100–199)
HDLC SERPL-MCNC: 78 MG/DL
IMP & REVIEW OF LAB RESULTS: NORMAL
LDLC SERPL CALC-MCNC: 106 MG/DL (ref 0–99)
TRIGL SERPL-MCNC: 121 MG/DL (ref 0–149)
VLDLC SERPL CALC-MCNC: 21 MG/DL (ref 5–40)

## 2024-03-25 DIAGNOSIS — F51.01 PRIMARY INSOMNIA: ICD-10-CM

## 2024-03-25 DIAGNOSIS — F41.9 ANXIETY: Primary | ICD-10-CM

## 2024-03-25 DIAGNOSIS — F41.9 ANXIETY: ICD-10-CM

## 2024-03-25 DIAGNOSIS — R03.0 ELEVATED BLOOD-PRESSURE READING, WITHOUT DIAGNOSIS OF HYPERTENSION: ICD-10-CM

## 2024-03-25 RX ORDER — ALPRAZOLAM 0.5 MG/1
0.5 TABLET ORAL 2 TIMES DAILY PRN
Qty: 60 TABLET | Refills: 1 | Status: SHIPPED | OUTPATIENT
Start: 2024-03-25 | End: 2024-04-24

## 2024-03-25 RX ORDER — ZOLPIDEM TARTRATE 10 MG/1
TABLET ORAL
Qty: 30 TABLET | Refills: 1 | Status: SHIPPED | OUTPATIENT
Start: 2024-03-25 | End: 2024-04-24

## 2024-03-25 RX ORDER — LISINOPRIL 10 MG/1
10 TABLET ORAL DAILY
Qty: 90 TABLET | Refills: 1 | Status: SHIPPED | OUTPATIENT
Start: 2024-03-25

## 2024-03-27 DIAGNOSIS — F51.01 PRIMARY INSOMNIA: ICD-10-CM

## 2024-03-27 RX ORDER — ZOLPIDEM TARTRATE 10 MG/1
TABLET ORAL
Qty: 30 TABLET | Refills: 1 | Status: CANCELLED | OUTPATIENT
Start: 2024-03-27 | End: 2024-04-26

## 2024-03-28 NOTE — TELEPHONE ENCOUNTER
Please call pharmacy and authorize an early refill o Ambien (med already on file) for vacation override. Berna

## 2024-05-06 RX ORDER — ROSUVASTATIN CALCIUM 5 MG/1
5 TABLET, COATED ORAL NIGHTLY
Qty: 30 TABLET | Refills: 3 | Status: SHIPPED | OUTPATIENT
Start: 2024-05-06

## 2024-05-15 RX ORDER — OXCARBAZEPINE 300 MG/1
TABLET, FILM COATED ORAL
Qty: 90 TABLET | Refills: 5 | Status: SHIPPED | OUTPATIENT
Start: 2024-05-15

## 2024-05-17 RX ORDER — OXCARBAZEPINE 300 MG/1
TABLET, FILM COATED ORAL
Qty: 90 TABLET | Refills: 5 | OUTPATIENT
Start: 2024-05-17

## 2024-06-05 DIAGNOSIS — F41.9 ANXIETY: ICD-10-CM

## 2024-06-05 DIAGNOSIS — F51.01 PRIMARY INSOMNIA: ICD-10-CM

## 2024-06-05 RX ORDER — ZOLPIDEM TARTRATE 10 MG/1
10 TABLET ORAL NIGHTLY PRN
Qty: 30 TABLET | Refills: 0 | Status: SHIPPED | OUTPATIENT
Start: 2024-06-05 | End: 2024-07-05

## 2024-06-05 RX ORDER — ALPRAZOLAM 0.5 MG/1
TABLET ORAL
Qty: 60 TABLET | Refills: 0 | Status: SHIPPED | OUTPATIENT
Start: 2024-06-05 | End: 2024-07-05

## 2024-06-10 ENCOUNTER — HOSPITAL ENCOUNTER (OUTPATIENT)
Facility: HOSPITAL | Age: 54
Discharge: HOME | End: 2024-06-13
Payer: COMMERCIAL

## 2024-06-10 DIAGNOSIS — Z12.31 SCREENING MAMMOGRAM FOR BREAST CANCER: ICD-10-CM

## 2024-06-10 PROCEDURE — 77063 BREAST TOMOSYNTHESIS BI: CPT

## 2024-06-15 ENCOUNTER — HOSPITAL ENCOUNTER (EMERGENCY)
Facility: HOSPITAL | Age: 54
Discharge: HOME | End: 2024-06-16
Attending: EMERGENCY MEDICINE
Payer: COMMERCIAL

## 2024-06-15 DIAGNOSIS — I95.9 HYPOTENSION, UNSPECIFIED HYPOTENSION TYPE: ICD-10-CM

## 2024-06-15 DIAGNOSIS — R55 SYNCOPE AND COLLAPSE: Primary | ICD-10-CM

## 2024-06-15 LAB
ALBUMIN SERPL-MCNC: 4.2 G/DL (ref 3.5–5.2)
ALBUMIN/GLOB SERPL: 1.5 (ref 1.1–2.2)
ALP SERPL-CCNC: 77 U/L (ref 35–104)
ALT SERPL-CCNC: 19 U/L (ref 10–35)
ANION GAP SERPL CALC-SCNC: 12 MMOL/L (ref 5–15)
AST SERPL-CCNC: 23 U/L (ref 10–35)
BASOPHILS # BLD: 0.1 K/UL (ref 0–1)
BASOPHILS NFR BLD: 1 % (ref 0–1)
BILIRUB SERPL-MCNC: 0.2 MG/DL (ref 0.2–1)
BUN SERPL-MCNC: 10 MG/DL (ref 6–20)
BUN/CREAT SERPL: 13 (ref 12–20)
CALCIUM SERPL-MCNC: 9.1 MG/DL (ref 8.6–10)
CHLORIDE SERPL-SCNC: 99 MMOL/L (ref 98–107)
CO2 SERPL-SCNC: 27 MMOL/L (ref 22–29)
CREAT SERPL-MCNC: 0.79 MG/DL (ref 0.5–0.9)
DIFFERENTIAL METHOD BLD: ABNORMAL
EOSINOPHIL # BLD: 0.2 K/UL (ref 0–0.4)
EOSINOPHIL NFR BLD: 2 %
ERYTHROCYTE [DISTWIDTH] IN BLOOD BY AUTOMATED COUNT: 12.1 % (ref 11.5–14.5)
ETHANOL SERPL-MCNC: <10 MG/DL (ref 0–10)
GLOBULIN SER CALC-MCNC: 2.8 G/DL (ref 2–4)
GLUCOSE SERPL-MCNC: 137 MG/DL (ref 65–100)
HCT VFR BLD AUTO: 39.3 % (ref 35–47)
HGB BLD-MCNC: 13.8 G/DL (ref 11.5–16)
IMM GRANULOCYTES # BLD AUTO: 0 K/UL (ref 0–0.04)
IMM GRANULOCYTES NFR BLD AUTO: 0 % (ref 0–0.5)
LYMPHOCYTES # BLD: 3.6 K/UL (ref 0.8–3.5)
LYMPHOCYTES NFR BLD: 46 % (ref 12–49)
MCH RBC QN AUTO: 32.1 PG (ref 26–34)
MCHC RBC AUTO-ENTMCNC: 35.1 G/DL (ref 30–36.5)
MCV RBC AUTO: 91.4 FL (ref 80–99)
MONOCYTES # BLD: 0.5 K/UL (ref 0–1)
MONOCYTES NFR BLD: 6 % (ref 5–13)
NEUTS SEG # BLD: 3.6 K/UL (ref 1.8–8)
NEUTS SEG NFR BLD: 45 % (ref 32–75)
NRBC # BLD: 0 K/UL (ref 0–0.01)
NRBC BLD-RTO: 0 PER 100 WBC
PLATELET # BLD AUTO: 297 K/UL (ref 150–400)
PMV BLD AUTO: 8.6 FL (ref 8.9–12.9)
POTASSIUM SERPL-SCNC: 4.1 MMOL/L (ref 3.5–5.1)
PROT SERPL-MCNC: 7 G/DL (ref 6.4–8.3)
RBC # BLD AUTO: 4.3 M/UL (ref 3.8–5.2)
SODIUM SERPL-SCNC: 138 MMOL/L (ref 136–145)
WBC # BLD AUTO: 7.9 K/UL (ref 3.6–11)

## 2024-06-15 PROCEDURE — 82077 ASSAY SPEC XCP UR&BREATH IA: CPT

## 2024-06-15 PROCEDURE — 36415 COLL VENOUS BLD VENIPUNCTURE: CPT

## 2024-06-15 PROCEDURE — 80053 COMPREHEN METABOLIC PANEL: CPT

## 2024-06-15 PROCEDURE — 93005 ELECTROCARDIOGRAM TRACING: CPT | Performed by: EMERGENCY MEDICINE

## 2024-06-15 PROCEDURE — 99284 EMERGENCY DEPT VISIT MOD MDM: CPT

## 2024-06-15 PROCEDURE — 85025 COMPLETE CBC W/AUTO DIFF WBC: CPT

## 2024-06-15 PROCEDURE — 2580000003 HC RX 258: Performed by: EMERGENCY MEDICINE

## 2024-06-15 PROCEDURE — 96360 HYDRATION IV INFUSION INIT: CPT

## 2024-06-15 PROCEDURE — 96361 HYDRATE IV INFUSION ADD-ON: CPT

## 2024-06-15 RX ORDER — 0.9 % SODIUM CHLORIDE 0.9 %
1000 INTRAVENOUS SOLUTION INTRAVENOUS ONCE
Status: COMPLETED | OUTPATIENT
Start: 2024-06-15 | End: 2024-06-15

## 2024-06-15 RX ORDER — 0.9 % SODIUM CHLORIDE 0.9 %
1000 INTRAVENOUS SOLUTION INTRAVENOUS ONCE
Status: COMPLETED | OUTPATIENT
Start: 2024-06-15 | End: 2024-06-16

## 2024-06-15 RX ADMIN — SODIUM CHLORIDE 1000 ML: 9 INJECTION, SOLUTION INTRAVENOUS at 22:35

## 2024-06-15 RX ADMIN — SODIUM CHLORIDE 1000 ML: 9 INJECTION, SOLUTION INTRAVENOUS at 23:33

## 2024-06-15 ASSESSMENT — PAIN - FUNCTIONAL ASSESSMENT: PAIN_FUNCTIONAL_ASSESSMENT: NONE - DENIES PAIN

## 2024-06-16 VITALS
TEMPERATURE: 98.1 F | RESPIRATION RATE: 18 BRPM | OXYGEN SATURATION: 98 % | HEART RATE: 80 BPM | SYSTOLIC BLOOD PRESSURE: 104 MMHG | BODY MASS INDEX: 27.6 KG/M2 | DIASTOLIC BLOOD PRESSURE: 74 MMHG | HEIGHT: 62 IN | WEIGHT: 150 LBS

## 2024-06-16 LAB
EKG ATRIAL RATE: 74 BPM
EKG DIAGNOSIS: NORMAL
EKG P AXIS: 47 DEGREES
EKG P-R INTERVAL: 140 MS
EKG Q-T INTERVAL: 420 MS
EKG QRS DURATION: 84 MS
EKG QTC CALCULATION (BAZETT): 466 MS
EKG R AXIS: 44 DEGREES
EKG T AXIS: 54 DEGREES
EKG VENTRICULAR RATE: 74 BPM

## 2024-06-16 PROCEDURE — 93010 ELECTROCARDIOGRAM REPORT: CPT | Performed by: INTERNAL MEDICINE

## 2024-06-16 NOTE — ED TRIAGE NOTES
Pt arrives to the ED via EMS. Pt states she took 10mg ambien tonight as she does every night, but tonight she got up to get a snack and \"passed out.\" Pt was only responsive to pain via sternum rub when EMS arrived. Pt became alert during transport. Pt states she has had episodes in the past where she does not remember doing something at night, but has never lost consciousness like she did tonight. Pt denies hitting her head and is not on blood thinners.

## 2024-06-16 NOTE — ED PROVIDER NOTES
[facility] [place]  EMERGENCY DEPARTMENT ENCOUNTER      Pt Name: Krystin Del Toro  MRN: [id number]  Birthdate 1970  Date of evaluation: 6/15/2024  Provider: Gagandeep Choudhury MD    CHIEF COMPLAINT       Chief Complaint   Patient presents with    Loss of Consciousness         HISTORY OF PRESENT ILLNESS   (Location/Symptom, Timing/Onset, Context/Setting, Quality, Duration, Modifying Factors, Severity)  Note limiting factors.   54-year-old female had a syncopal episode shortly after taking Ambien tonight.  No fall or injury.  Patient continues to be a little bit somnolent and admits to having alcohol prior to taking the Ambien.  No injuries from fall and is here for evaluation of hypotension noted by EMS with systolic pressures in the high 70s.    The history is provided by the patient.         Review of External Medical Records:     Nursing Notes were reviewed.    REVIEW OF SYSTEMS    (2-9 systems for level 4, 10 or more for level 5)     Review of Systems   Constitutional:  Negative for activity change, appetite change, chills and diaphoresis.   HENT:  Negative for congestion, ear pain, facial swelling and sore throat.    Eyes:  Negative for pain, discharge and visual disturbance.   Respiratory:  Negative for cough, chest tightness and shortness of breath.    Cardiovascular:  Negative for chest pain and palpitations.   Gastrointestinal:  Negative for abdominal pain, diarrhea, nausea and vomiting.   Endocrine: Negative for cold intolerance, heat intolerance, polydipsia and polyphagia.   Genitourinary:  Negative for difficulty urinating, dysuria, frequency, hematuria, urgency and vaginal discharge.   Musculoskeletal:  Negative for arthralgias, back pain, joint swelling and myalgias.   Skin:  Negative for rash and wound.   Neurological:  Negative for dizziness, syncope, facial asymmetry and headaches.   Psychiatric/Behavioral:  Negative for agitation, behavioral problems and confusion.    All other systems

## 2024-06-16 NOTE — ED NOTES
Pt given discharge instructions, patient education, and follow up information. Pt verbalizes understanding. All questions answered. Pt discharged to home in private vehicle, ambulatory. Pt A&Ox4, RA, pain controlled.

## 2024-06-18 ENCOUNTER — TELEPHONE (OUTPATIENT)
Age: 54
End: 2024-06-18

## 2024-06-18 DIAGNOSIS — R03.0 ELEVATED BLOOD-PRESSURE READING, WITHOUT DIAGNOSIS OF HYPERTENSION: ICD-10-CM

## 2024-06-18 RX ORDER — LISINOPRIL 10 MG/1
10 TABLET ORAL DAILY
Qty: 90 TABLET | Refills: 1 | Status: SHIPPED | OUTPATIENT
Start: 2024-06-18

## 2024-06-18 NOTE — TELEPHONE ENCOUNTER
----- Message from Artem Rubio sent at 6/18/2024 10:08 AM EDT -----  Regarding: [facility] Appointment Request  [facility] Appointment Request    Patient needs appointment for :Existing Condition/ Follow-up .    Reason for Appointment Request: Available appointments did not meet patient need  -patient wants to RESCHEDULE her appointment on June 25 into a sooner one as she has another appointment on the same day.   -patient prefers sooner than June 25    --------------------------------------------------------------------------------------------------------------------------    Relationship to Patient: Self     Call Back Information: OK to leave message on voicemail  Preferred Call Back Number: Phone [phone]

## 2024-07-22 DIAGNOSIS — F51.01 PRIMARY INSOMNIA: ICD-10-CM

## 2024-07-22 DIAGNOSIS — F41.9 ANXIETY: ICD-10-CM

## 2024-07-22 RX ORDER — ALPRAZOLAM 0.5 MG
0.5 TABLET ORAL 2 TIMES DAILY PRN
Qty: 60 TABLET | OUTPATIENT
Start: 2024-07-22

## 2024-07-22 RX ORDER — ZOLPIDEM TARTRATE 10 MG/1
TABLET ORAL
Qty: 30 TABLET | OUTPATIENT
Start: 2024-07-22

## 2024-07-23 ENCOUNTER — HOSPITAL ENCOUNTER (EMERGENCY)
Facility: HOSPITAL | Age: 54
Discharge: HOME | End: 2024-07-23
Attending: EMERGENCY MEDICINE
Payer: COMMERCIAL

## 2024-07-23 VITALS
OXYGEN SATURATION: 95 % | HEIGHT: 62 IN | TEMPERATURE: 98.8 F | SYSTOLIC BLOOD PRESSURE: 127 MMHG | WEIGHT: 160 LBS | BODY MASS INDEX: 29.44 KG/M2 | HEART RATE: 100 BPM | DIASTOLIC BLOOD PRESSURE: 84 MMHG | RESPIRATION RATE: 18 BRPM

## 2024-07-23 DIAGNOSIS — R21 RASH AND OTHER NONSPECIFIC SKIN ERUPTION: Primary | ICD-10-CM

## 2024-07-23 PROCEDURE — 99283 EMERGENCY DEPT VISIT LOW MDM: CPT

## 2024-07-23 PROCEDURE — 6370000000 HC RX 637 (ALT 250 FOR IP)

## 2024-07-23 RX ORDER — CETIRIZINE HYDROCHLORIDE 10 MG/1
10 TABLET ORAL DAILY
Qty: 30 TABLET | Refills: 0 | Status: SHIPPED | OUTPATIENT
Start: 2024-07-23

## 2024-07-23 RX ORDER — FAMOTIDINE 20 MG/1
20 TABLET, FILM COATED ORAL
Status: COMPLETED | OUTPATIENT
Start: 2024-07-23 | End: 2024-07-23

## 2024-07-23 RX ORDER — CETIRIZINE HYDROCHLORIDE 10 MG/1
10 TABLET ORAL DAILY
Status: DISCONTINUED | OUTPATIENT
Start: 2024-07-23 | End: 2024-07-23 | Stop reason: HOSPADM

## 2024-07-23 RX ORDER — FAMOTIDINE 20 MG/1
20 TABLET, FILM COATED ORAL 2 TIMES DAILY
Qty: 30 TABLET | Refills: 0 | Status: SHIPPED | OUTPATIENT
Start: 2024-07-23

## 2024-07-23 RX ORDER — PREDNISONE 50 MG/1
50 TABLET ORAL DAILY
Qty: 3 TABLET | Refills: 0 | Status: SHIPPED | OUTPATIENT
Start: 2024-07-23 | End: 2024-07-26

## 2024-07-23 RX ADMIN — CETIRIZINE HYDROCHLORIDE 10 MG: 10 TABLET, FILM COATED ORAL at 09:56

## 2024-07-23 RX ADMIN — PREDNISONE 50 MG: 10 TABLET ORAL at 09:55

## 2024-07-23 RX ADMIN — FAMOTIDINE 20 MG: 20 TABLET, FILM COATED ORAL at 09:55

## 2024-07-23 NOTE — ED NOTES
DC instructions reviewed with pt. Pt verbalizes understanding of instructions, f/u care, and e-scribe RX x3. Pt ambulatory with steady gait to ED Lobby to leave.

## 2024-07-23 NOTE — ED TRIAGE NOTES
Patient arrives c/o of diffuse raised red itchy rash on bilateral arms, thighs and back since yesterday. Using hydrocortisone without relief. Reports taking bendryl last night.

## 2024-07-23 NOTE — DISCHARGE INSTRUCTIONS
Thank you for allowing us to provide you with medical care today.  We realize that you have many choices for your emergency care needs.  We thank you for choosing [facility].  Please choose us in the future for any continued health care needs.     We hope we addressed all of your medical concerns. We strive to provide excellent quality care in the Emergency Department.  Anything less than excellent does not meet our expectations.     The exam and treatment you received in the Emergency Department were for an emergent problem and are not intended as complete care. It is important that you follow up with a doctor, nurse practitioner, or physician’s assistant for ongoing care. If your symptoms worsen or you do not improve as expected and you are unable to reach your usual health care provider, you should return to the Emergency Department. We are available 24 hours a day.     Take this sheet with you when you go to your follow-up visit.     If you have any problem arranging the follow-up visit, contact the Emergency Department immediately.     Make an appointment your family doctor for follow up of this visit. Return to the ER if you are unable to be seen in a timely manner.     Below is a summary of your results.    Labs  No results found for this or any previous visit (from the past 12 hour(s)).    Radiologic Studies  No orders to display

## 2024-07-23 NOTE — ED PROVIDER NOTES
[id number]  [phone]    If symptoms worsen    Berna Vázquez APRN - NP  [id number] [facility] [address]  [facility] [address]  [phone]    Call in 2 days      [facility] [address]  [address]  [facility] [address]  [phone]    As needed      DISCHARGE MEDICATIONS:  Discharge Medication List as of 7/23/2024  9:59 AM        START taking these medications    Details   famotidine (PEPCID) 20 MG tablet Take 1 tablet by mouth 2 times daily, Disp-30 tablet, R-0Normal      predniSONE (DELTASONE) 50 MG tablet Take 1 tablet by mouth daily for 3 days, Disp-3 tablet, R-0Normal               (Please note that parts of this dictation were completed with voice recognition software. Quite often unanticipated grammatical, syntax, homophones, and other interpretive errors are inadvertently transcribed by the computer software. Efforts were made to edit the dictation but occasionally words remain mis-transcribed.)    DAVID Cross NP (electronically signed)  Emergency Attending Physician / Physician Assistant / Nurse Practitioner     Lesly Burch APRN - NP  07/23/24 1012

## 2024-08-22 ENCOUNTER — OFFICE VISIT (OUTPATIENT)
Age: 54
End: 2024-08-22
Payer: COMMERCIAL

## 2024-08-22 VITALS
OXYGEN SATURATION: 96 % | TEMPERATURE: 98.1 F | SYSTOLIC BLOOD PRESSURE: 112 MMHG | DIASTOLIC BLOOD PRESSURE: 77 MMHG | RESPIRATION RATE: 19 BRPM | WEIGHT: 163 LBS | HEIGHT: 62 IN | BODY MASS INDEX: 30 KG/M2 | HEART RATE: 94 BPM

## 2024-08-22 DIAGNOSIS — L40.50 ARTHROPATHIC PSORIASIS, UNSPECIFIED (HCC): ICD-10-CM

## 2024-08-22 DIAGNOSIS — F51.01 PRIMARY INSOMNIA: ICD-10-CM

## 2024-08-22 DIAGNOSIS — F90.2 ATTENTION DEFICIT HYPERACTIVITY DISORDER (ADHD), COMBINED TYPE: Primary | ICD-10-CM

## 2024-08-22 PROCEDURE — 99214 OFFICE O/P EST MOD 30 MIN: CPT | Performed by: NURSE PRACTITIONER

## 2024-08-22 RX ORDER — CLOBETASOL PROPIONATE 0.5 MG/G
OINTMENT TOPICAL 2 TIMES DAILY
Qty: 60 G | Refills: 0 | Status: SHIPPED | OUTPATIENT
Start: 2024-08-22

## 2024-08-22 RX ORDER — DEXTROAMPHETAMINE SACCHARATE, AMPHETAMINE ASPARTATE, DEXTROAMPHETAMINE SULFATE AND AMPHETAMINE SULFATE 5; 5; 5; 5 MG/1; MG/1; MG/1; MG/1
20 TABLET ORAL 2 TIMES DAILY
Qty: 60 TABLET | Refills: 0 | Status: SHIPPED | OUTPATIENT
Start: 2024-08-22 | End: 2024-09-21

## 2024-08-22 RX ORDER — TRAZODONE HYDROCHLORIDE 100 MG/1
100 TABLET ORAL NIGHTLY PRN
Qty: 30 TABLET | Refills: 5 | Status: SHIPPED | OUTPATIENT
Start: 2024-08-22

## 2024-08-22 NOTE — PROGRESS NOTES
Chief Complaint   Patient presents with    Follow-up    Lab Collection     Patient is in the office today for a f/u and labs.  Patient stated she wants to consider taking a new sleep med.   No other concerns.    \"Have you been to the ER, urgent care clinic since your last visit?  Hospitalized since your last visit?\"    YES - When: approximately 6/15  and 7/23 [facility] ago.  Where and Why: Rash and syncope.    “Have you seen or consulted any other health care providers outside of [facility] since your last visit?”    NO        “Have you had a colorectal cancer screening such as a colonoscopy/FIT/Cologuard?    NO    No colonoscopy on file  No cologuard on file  No FIT/FOBT on file   No flexible sigmoidoscopy on file         Click Here for Release of Records Request   
called 911. The attending physicians attributed the episode to low blood pressure, possibly due to long-term Ambien use. She has since been struggling with sleep, even when trying melatonin. She reports that Ambien was effective in helping her sleep and wake up refreshed.    She also takes Adderall, which she finds beneficial. She wakes up early, around 3:30 AM, as she needs to be at work by 6 AM.    Review of Systems   A comprehensive review of system was obtained and negative except findings in the HPI      Objective   Blood pressure 112/77, pulse 94, temperature 98.1 °F (36.7 °C), temperature source Oral, resp. rate 19, height 1.575 m (5' 2\"), weight 73.9 kg (163 lb), SpO2 96%.  Physical Exam  Physical Examination:   GENERAL ASSESSMENT: well developed and well nourished  CHEST: normal air exchange, no rales, no rhonchi, no wheezes, respiratory effort normal with no retractions  HEART: regular rate and rhythm, normal S1/S2, no murmurs             The patient (or guardian, if applicable) and other individuals in attendance with the patient were advised that Artificial Intelligence will be utilized during this visit to record, process the conversation to generate a clinical note and to support improvement of the AI technology. The patient (or guardian, if applicable) and other individuals in attendance at the appointment consented to the use of AI, including the recording.      An electronic signature was used to authenticate this note.    --DAVID Day NP

## 2024-09-03 RX ORDER — ROSUVASTATIN CALCIUM 5 MG/1
5 TABLET, COATED ORAL NIGHTLY
Qty: 30 TABLET | Refills: 3 | Status: SHIPPED | OUTPATIENT
Start: 2024-09-03

## 2024-09-04 DIAGNOSIS — F41.9 ANXIETY: ICD-10-CM

## 2024-09-04 RX ORDER — ALPRAZOLAM 0.5 MG
0.5 TABLET ORAL 2 TIMES DAILY PRN
Qty: 60 TABLET | Refills: 1 | Status: SHIPPED | OUTPATIENT
Start: 2024-09-04 | End: 2024-10-04

## 2024-09-24 DIAGNOSIS — F41.9 ANXIETY: ICD-10-CM

## 2024-11-11 RX ORDER — OXCARBAZEPINE 300 MG/1
TABLET, FILM COATED ORAL
Qty: 90 TABLET | Refills: 5 | Status: SHIPPED | OUTPATIENT
Start: 2024-11-11

## 2024-11-26 ENCOUNTER — OFFICE VISIT (OUTPATIENT)
Facility: CLINIC | Age: 54
End: 2024-11-26
Payer: COMMERCIAL

## 2024-11-26 VITALS
HEIGHT: 62 IN | BODY MASS INDEX: 30.18 KG/M2 | HEART RATE: 87 BPM | RESPIRATION RATE: 19 BRPM | OXYGEN SATURATION: 99 % | DIASTOLIC BLOOD PRESSURE: 80 MMHG | TEMPERATURE: 98.1 F | WEIGHT: 164 LBS | SYSTOLIC BLOOD PRESSURE: 114 MMHG

## 2024-11-26 DIAGNOSIS — B96.89 ACUTE BACTERIAL SINUSITIS: ICD-10-CM

## 2024-11-26 DIAGNOSIS — R03.0 ELEVATED BLOOD-PRESSURE READING, WITHOUT DIAGNOSIS OF HYPERTENSION: ICD-10-CM

## 2024-11-26 DIAGNOSIS — E78.00 HYPERCHOLESTEREMIA: Primary | ICD-10-CM

## 2024-11-26 DIAGNOSIS — L40.50 ARTHROPATHIC PSORIASIS, UNSPECIFIED (HCC): ICD-10-CM

## 2024-11-26 DIAGNOSIS — J01.90 ACUTE BACTERIAL SINUSITIS: ICD-10-CM

## 2024-11-26 PROCEDURE — 99214 OFFICE O/P EST MOD 30 MIN: CPT | Performed by: NURSE PRACTITIONER

## 2024-11-26 RX ORDER — CEFDINIR 300 MG/1
300 CAPSULE ORAL 2 TIMES DAILY
Qty: 20 CAPSULE | Refills: 0 | Status: SHIPPED | OUTPATIENT
Start: 2024-11-26 | End: 2024-12-06

## 2024-11-26 RX ORDER — CETIRIZINE HYDROCHLORIDE 10 MG/1
10 TABLET ORAL DAILY
Qty: 90 TABLET | Refills: 3 | Status: SHIPPED | OUTPATIENT
Start: 2024-11-26

## 2024-11-26 NOTE — PROGRESS NOTES
Chief Complaint   Patient presents with    Follow-up    Lab Collection    Medication Check     Patient presents in the office today for a f/u and labs.  Patient would like her right ear checked, stated that her ear hurts.   No other concerns.    \"Have you been to the ER, urgent care clinic since your last visit?  Hospitalized since your last visit?\"    NO    “Have you seen or consulted any other health care providers outside our system since your last visit?”    NO      “Have you had a colorectal cancer screening such as a colonoscopy/FIT/Cologuard?    NO    No colonoscopy on file  No cologuard on file  No FIT/FOBT on file   No flexible sigmoidoscopy on file

## 2024-11-27 LAB
ALBUMIN SERPL-MCNC: 4.7 G/DL (ref 3.8–4.9)
ALP SERPL-CCNC: 79 IU/L (ref 44–121)
ALT SERPL-CCNC: 19 IU/L (ref 0–32)
AST SERPL-CCNC: 18 IU/L (ref 0–40)
BASOPHILS # BLD AUTO: 0 X10E3/UL (ref 0–0.2)
BASOPHILS NFR BLD AUTO: 0 %
BILIRUB SERPL-MCNC: <0.2 MG/DL (ref 0–1.2)
BUN SERPL-MCNC: 15 MG/DL (ref 6–24)
BUN/CREAT SERPL: 19 (ref 9–23)
CALCIUM SERPL-MCNC: 9.7 MG/DL (ref 8.7–10.2)
CHLORIDE SERPL-SCNC: 99 MMOL/L (ref 96–106)
CHOLEST SERPL-MCNC: 221 MG/DL (ref 100–199)
CO2 SERPL-SCNC: 25 MMOL/L (ref 20–29)
CREAT SERPL-MCNC: 0.79 MG/DL (ref 0.57–1)
EGFRCR SERPLBLD CKD-EPI 2021: 89 ML/MIN/1.73
EOSINOPHIL # BLD AUTO: 0.1 X10E3/UL (ref 0–0.4)
EOSINOPHIL NFR BLD AUTO: 2 %
ERYTHROCYTE [DISTWIDTH] IN BLOOD BY AUTOMATED COUNT: 12.7 % (ref 11.7–15.4)
GLOBULIN SER CALC-MCNC: 2.8 G/DL (ref 1.5–4.5)
GLUCOSE SERPL-MCNC: 94 MG/DL (ref 70–99)
HCT VFR BLD AUTO: 39.7 % (ref 34–46.6)
HDLC SERPL-MCNC: 75 MG/DL
HGB BLD-MCNC: 13.1 G/DL (ref 11.1–15.9)
IMM GRANULOCYTES # BLD AUTO: 0 X10E3/UL (ref 0–0.1)
IMM GRANULOCYTES NFR BLD AUTO: 0 %
IMP & REVIEW OF LAB RESULTS: NORMAL
LDLC SERPL CALC-MCNC: 126 MG/DL (ref 0–99)
LYMPHOCYTES # BLD AUTO: 2 X10E3/UL (ref 0.7–3.1)
LYMPHOCYTES NFR BLD AUTO: 33 %
MCH RBC QN AUTO: 32.3 PG (ref 26.6–33)
MCHC RBC AUTO-ENTMCNC: 33 G/DL (ref 31.5–35.7)
MCV RBC AUTO: 98 FL (ref 79–97)
MONOCYTES # BLD AUTO: 0.5 X10E3/UL (ref 0.1–0.9)
MONOCYTES NFR BLD AUTO: 9 %
NEUTROPHILS # BLD AUTO: 3.2 X10E3/UL (ref 1.4–7)
NEUTROPHILS NFR BLD AUTO: 56 %
PLATELET # BLD AUTO: 273 X10E3/UL (ref 150–450)
POTASSIUM SERPL-SCNC: 4.8 MMOL/L (ref 3.5–5.2)
PROT SERPL-MCNC: 7.5 G/DL (ref 6–8.5)
RBC # BLD AUTO: 4.05 X10E6/UL (ref 3.77–5.28)
SODIUM SERPL-SCNC: 144 MMOL/L (ref 134–144)
TRIGL SERPL-MCNC: 113 MG/DL (ref 0–149)
VLDLC SERPL CALC-MCNC: 20 MG/DL (ref 5–40)
WBC # BLD AUTO: 5.9 X10E3/UL (ref 3.4–10.8)

## 2024-11-27 NOTE — PROGRESS NOTES
Krystin Del Toro (:  1970) is a 54 y.o. female,Established patient, here for evaluation of the following chief complaint(s):  Follow-up, Lab Collection, and Medication Check       Krystin was seen today for follow-up, lab collection and medication check.    Diagnoses and all orders for this visit:    Hypercholesteremia  -     Lipid Panel; Future    Elevated blood-pressure reading, without diagnosis of hypertension  -     CBC with Auto Differential; Future  -     Comprehensive Metabolic Panel; Future    Acute bacterial sinusitis  -     cetirizine (ZYRTEC) 10 MG tablet; Take 1 tablet by mouth daily  -     cefdinir (OMNICEF) 300 MG capsule; Take 1 capsule by mouth 2 times daily for 10 days    Arthropathic psoriasis, unspecified (HCC)  -     External Referral To Dermatology    Reveiwed adr/se of medication  Push fluids, rest, suggested mucinex for congestion and drainage  Recheck 5-7 days if sx not improved.  Given referral to derm  Labs updated    Subjective   HPI  Patient complains of bilateral ear pressure/pain. Symptoms include congestion, headache described as frontal, lightheadedness, low grade fever, post nasal drip, productive cough with  yellow colored sputum, sinus pressure, tooth pain and vertigo. Onset of symptoms was 5 days ago, gradually worsening since that time. Patient is drinking plenty of fluids..  Past history is significant for no history of pneumonia or bronchitis. Patient is non-smoker    She needs to get a new referral to derm, hers has retired  Due for chol and bp labs    Review of Systems   A comprehensive review of system was obtained and negative except findings in the HPI    /80 (Site: Left Upper Arm, Position: Sitting)   Pulse 87   Temp 98.1 °F (36.7 °C) (Oral)   Resp 19   Ht 1.575 m (5' 2\")   Wt 74.4 kg (164 lb)   SpO2 99%   BMI 30.00 kg/m²   Objective   Physical Exam  Vitals and nursing note reviewed.   Constitutional:       Appearance: Normal appearance.   HENT:

## 2024-12-02 RX ORDER — ROSUVASTATIN CALCIUM 10 MG/1
10 TABLET, COATED ORAL NIGHTLY
Qty: 90 TABLET | Refills: 1 | Status: SHIPPED | OUTPATIENT
Start: 2024-12-02

## 2024-12-19 ENCOUNTER — TELEPHONE (OUTPATIENT)
Facility: CLINIC | Age: 54
End: 2024-12-19

## 2024-12-19 NOTE — TELEPHONE ENCOUNTER
Pt states medication is really not helping her with her symptom and she does not feel better   cefdinir (OMNICEF) 300 MG capsule   Pt asked if you can prescribe something different to help.  CB# [phone]  Thank You

## 2024-12-23 RX ORDER — AZITHROMYCIN 250 MG/1
TABLET, FILM COATED ORAL
Qty: 6 TABLET | Refills: 0 | Status: SHIPPED | OUTPATIENT
Start: 2024-12-23 | End: 2024-12-23

## 2024-12-23 RX ORDER — DOXYCYCLINE HYCLATE 100 MG
100 TABLET ORAL 2 TIMES DAILY
Qty: 20 TABLET | Refills: 0 | Status: SHIPPED | OUTPATIENT
Start: 2024-12-23 | End: 2025-01-02

## 2025-01-06 DIAGNOSIS — F90.2 ATTENTION DEFICIT HYPERACTIVITY DISORDER (ADHD), COMBINED TYPE: Primary | ICD-10-CM

## 2025-01-06 RX ORDER — DEXTROAMPHETAMINE SACCHARATE, AMPHETAMINE ASPARTATE, DEXTROAMPHETAMINE SULFATE AND AMPHETAMINE SULFATE 5; 5; 5; 5 MG/1; MG/1; MG/1; MG/1
20 TABLET ORAL 2 TIMES DAILY
Qty: 60 TABLET | Refills: 0 | Status: SHIPPED | OUTPATIENT
Start: 2025-02-05 | End: 2025-03-07

## 2025-01-06 RX ORDER — DEXTROAMPHETAMINE SACCHARATE, AMPHETAMINE ASPARTATE, DEXTROAMPHETAMINE SULFATE AND AMPHETAMINE SULFATE 5; 5; 5; 5 MG/1; MG/1; MG/1; MG/1
20 TABLET ORAL 2 TIMES DAILY
Qty: 60 TABLET | Refills: 0 | Status: SHIPPED | OUTPATIENT
Start: 2025-01-06 | End: 2025-02-05

## 2025-01-06 RX ORDER — DEXTROAMPHETAMINE SACCHARATE, AMPHETAMINE ASPARTATE, DEXTROAMPHETAMINE SULFATE AND AMPHETAMINE SULFATE 5; 5; 5; 5 MG/1; MG/1; MG/1; MG/1
20 TABLET ORAL 2 TIMES DAILY
Qty: 60 TABLET | Refills: 0 | Status: SHIPPED | OUTPATIENT
Start: 2025-03-07 | End: 2025-04-06

## 2025-02-18 RX ORDER — TRAZODONE HYDROCHLORIDE 100 MG/1
100 TABLET ORAL NIGHTLY PRN
Qty: 90 TABLET | Refills: 1 | Status: SHIPPED | OUTPATIENT
Start: 2025-02-18

## 2025-02-21 ENCOUNTER — COMMUNITY OUTREACH (OUTPATIENT)
Facility: CLINIC | Age: 55
End: 2025-02-21

## 2025-02-27 ENCOUNTER — OFFICE VISIT (OUTPATIENT)
Facility: CLINIC | Age: 55
End: 2025-02-27
Payer: COMMERCIAL

## 2025-02-27 VITALS
HEART RATE: 94 BPM | OXYGEN SATURATION: 97 % | TEMPERATURE: 97.4 F | SYSTOLIC BLOOD PRESSURE: 130 MMHG | HEIGHT: 62 IN | DIASTOLIC BLOOD PRESSURE: 82 MMHG | BODY MASS INDEX: 28.67 KG/M2 | WEIGHT: 155.8 LBS

## 2025-02-27 DIAGNOSIS — H92.01 RIGHT EAR PAIN: ICD-10-CM

## 2025-02-27 DIAGNOSIS — F90.2 ATTENTION DEFICIT HYPERACTIVITY DISORDER (ADHD), COMBINED TYPE: Primary | ICD-10-CM

## 2025-02-27 PROCEDURE — 99213 OFFICE O/P EST LOW 20 MIN: CPT | Performed by: NURSE PRACTITIONER

## 2025-02-27 RX ORDER — SECUKINUMAB 150 MG/ML
300 INJECTION SUBCUTANEOUS
COMMUNITY
Start: 2025-01-28 | End: 2025-02-27 | Stop reason: CLARIF

## 2025-02-27 RX ORDER — ALPRAZOLAM 0.5 MG
TABLET ORAL
COMMUNITY
Start: 2025-01-05

## 2025-02-27 RX ORDER — FLUTICASONE PROPIONATE 50 MCG
SPRAY, SUSPENSION (ML) NASAL
COMMUNITY
Start: 2025-02-17

## 2025-02-27 SDOH — ECONOMIC STABILITY: FOOD INSECURITY: WITHIN THE PAST 12 MONTHS, THE FOOD YOU BOUGHT JUST DIDN'T LAST AND YOU DIDN'T HAVE MONEY TO GET MORE.: PATIENT DECLINED

## 2025-02-27 SDOH — ECONOMIC STABILITY: INCOME INSECURITY: IN THE LAST 12 MONTHS, WAS THERE A TIME WHEN YOU WERE NOT ABLE TO PAY THE MORTGAGE OR RENT ON TIME?: PATIENT DECLINED

## 2025-02-27 SDOH — ECONOMIC STABILITY: FOOD INSECURITY: WITHIN THE PAST 12 MONTHS, YOU WORRIED THAT YOUR FOOD WOULD RUN OUT BEFORE YOU GOT MONEY TO BUY MORE.: PATIENT DECLINED

## 2025-02-27 SDOH — ECONOMIC STABILITY: TRANSPORTATION INSECURITY
IN THE PAST 12 MONTHS, HAS LACK OF TRANSPORTATION KEPT YOU FROM MEETINGS, WORK, OR FROM GETTING THINGS NEEDED FOR DAILY LIVING?: PATIENT DECLINED

## 2025-02-27 SDOH — ECONOMIC STABILITY: TRANSPORTATION INSECURITY
IN THE PAST 12 MONTHS, HAS THE LACK OF TRANSPORTATION KEPT YOU FROM MEDICAL APPOINTMENTS OR FROM GETTING MEDICATIONS?: PATIENT DECLINED

## 2025-02-27 ASSESSMENT — PATIENT HEALTH QUESTIONNAIRE - PHQ9
SUM OF ALL RESPONSES TO PHQ9 QUESTIONS 1 & 2: 2
SUM OF ALL RESPONSES TO PHQ QUESTIONS 1-9: 2
1. LITTLE INTEREST OR PLEASURE IN DOING THINGS: SEVERAL DAYS
SUM OF ALL RESPONSES TO PHQ QUESTIONS 1-9: 2
2. FEELING DOWN, DEPRESSED OR HOPELESS: SEVERAL DAYS
SUM OF ALL RESPONSES TO PHQ QUESTIONS 1-9: 2
SUM OF ALL RESPONSES TO PHQ QUESTIONS 1-9: 2

## 2025-02-27 NOTE — PROGRESS NOTES
Krystin Del Toro (:  1970) is a 54 y.o. female, Established patient, here for evaluation of the following chief complaint(s):  Follow-up Chronic Condition         Assessment & Plan  1. Cholesterol management.  Her blood pressure readings are within the normal range. She is currently on rosuvastatin 10 mg. A follow-up appointment has been scheduled for May 2025 to conduct fasting labs for cholesterol and blood pressure monitoring.    2. Right ear discomfort.  A referral to an ENT specialist, Dr. Beauchamp, has been initiated for further evaluation and management of her right ear discomfort. The referral includes the name, address, and phone number for scheduling an appointment.    3. Medication management.  She reported that her Adderall prescription requires prior authorization and is currently being paid out-of-pocket through [employer]. A note will be sent to [facility] to contact the insurance company and resolve the prior authorization issue.    Follow-up  The patient will follow up in May 2025.    Results    1. Attention deficit hyperactivity disorder (ADHD), combined type  2. Right ear pain  -     [facility] - Joana Mar MD, Otolaryngology, [place]    Return for May for fasting labs for chol and bp.       Subjective   History of Present Illness  The patient presents for evaluation of cholesterol management, blood pressure management, right ear discomfort, and medication management.    She is currently on a regimen of rosuvastatin 10 mg, which was increased during her last visit. She has been adhering to a fasting schedule since 11:00 AM today in preparation for her lab work.    She reports experiencing issues with her right ear, which she suspects may be related to sinus problems. The discomfort in her ear is intermittent, with periods of relief lasting a few days. She describes a popping sensation in her ear upon swallowing.    She has been informed that her Adderall prescription does not

## 2025-02-27 NOTE — PROGRESS NOTES
Chief Complaint   Patient presents with    Follow-up Chronic Condition     \"Have you been to the ER, urgent care clinic since your last visit?  Hospitalized since your last visit?\"    YES - When: approximately 1 months ago.  [facility]    “Have you seen or consulted any other health care providers outside of [facility] since your last visit?”    YES - When: approximately 2 months ago.  Where and Why: Rheumatology for routine visit.     /82 (Site: Left Upper Arm, Position: Sitting)   Pulse 94   Temp 97.4 °F (36.3 °C) (Temporal)   Ht 1.575 m (5' 2\")   Wt 70.7 kg (155 lb 12.8 oz)   SpO2 97%   BMI 28.50 kg/m²      PHQ-9 Total Score: 2 (2/27/2025  2:28 PM)       [facility] Social Determinants Of Health (Sdoh) Screening Questionnaire       Question 2/27/2025 10:47 AM EST - Filed by Patient    Within the past 12 months, you worried that your food would run out before you got the money to buy more. Patient declined    Within the past 12 months, the food you bought just didn't last and you didn't have money to get more. Patient declined    In the past 12 months, has lack of transportation kept you from medical appointments or from getting medications? Patient declined    In the past 12 months, has lack of transportation kept you from meetings, work, or from getting things needed for daily living? Patient declined    In the last 12 months, was there a time when you were not able to pay the mortgage or rent on time? Patient declined    In the past 12 months, how many times have you moved where you were living?       At any time in the past 12 months, were you homeless or living in a shelter (including now)? Patient declined    In the past 12 months has the electric, gas, oil, or water company threatened to shut off services in your home? Patient declined    Would you like resources for any of these topics? No, thank you               “Have you had a colorectal cancer

## 2025-04-17 DIAGNOSIS — F41.9 ANXIETY: ICD-10-CM

## 2025-05-13 RX ORDER — OXCARBAZEPINE 300 MG/1
TABLET, FILM COATED ORAL
Qty: 90 TABLET | Refills: 5 | Status: SHIPPED | OUTPATIENT
Start: 2025-05-13

## 2025-05-23 DIAGNOSIS — R03.0 ELEVATED BLOOD-PRESSURE READING, WITHOUT DIAGNOSIS OF HYPERTENSION: ICD-10-CM

## 2025-05-26 RX ORDER — LISINOPRIL 10 MG/1
10 TABLET ORAL DAILY
Qty: 90 TABLET | Refills: 1 | Status: SHIPPED | OUTPATIENT
Start: 2025-05-26

## 2025-05-29 ENCOUNTER — OFFICE VISIT (OUTPATIENT)
Facility: CLINIC | Age: 55
End: 2025-05-29
Payer: COMMERCIAL

## 2025-05-29 VITALS
TEMPERATURE: 97.9 F | HEART RATE: 76 BPM | DIASTOLIC BLOOD PRESSURE: 76 MMHG | WEIGHT: 158 LBS | OXYGEN SATURATION: 98 % | SYSTOLIC BLOOD PRESSURE: 120 MMHG | BODY MASS INDEX: 29.08 KG/M2 | HEIGHT: 62 IN

## 2025-05-29 DIAGNOSIS — F90.2 ATTENTION DEFICIT HYPERACTIVITY DISORDER (ADHD), COMBINED TYPE: Primary | ICD-10-CM

## 2025-05-29 DIAGNOSIS — E78.00 HYPERCHOLESTEREMIA: ICD-10-CM

## 2025-05-29 PROCEDURE — 99213 OFFICE O/P EST LOW 20 MIN: CPT | Performed by: NURSE PRACTITIONER

## 2025-05-29 RX ORDER — DEXTROAMPHETAMINE SACCHARATE, AMPHETAMINE ASPARTATE, DEXTROAMPHETAMINE SULFATE AND AMPHETAMINE SULFATE 5; 5; 5; 5 MG/1; MG/1; MG/1; MG/1
20 TABLET ORAL DAILY
Qty: 30 TABLET | Refills: 0 | Status: SHIPPED | OUTPATIENT
Start: 2025-06-28 | End: 2025-07-28

## 2025-05-29 RX ORDER — SECUKINUMAB 150 MG/ML
300 INJECTION SUBCUTANEOUS
COMMUNITY
Start: 2025-05-20

## 2025-05-29 RX ORDER — NEOMYCIN SULFATE, POLYMYXIN B SULFATE AND DEXAMETHASONE 3.5; 10000; 1 MG/ML; [USP'U]/ML; MG/ML
1 SUSPENSION/ DROPS OPHTHALMIC 2 TIMES DAILY
COMMUNITY
Start: 2025-05-19

## 2025-05-29 RX ORDER — ROSUVASTATIN CALCIUM 10 MG/1
10 TABLET, COATED ORAL NIGHTLY
Qty: 90 TABLET | Refills: 3 | Status: SHIPPED | OUTPATIENT
Start: 2025-05-29

## 2025-05-29 RX ORDER — DEXTROAMPHETAMINE SACCHARATE, AMPHETAMINE ASPARTATE, DEXTROAMPHETAMINE SULFATE AND AMPHETAMINE SULFATE 5; 5; 5; 5 MG/1; MG/1; MG/1; MG/1
20 TABLET ORAL DAILY
Qty: 30 TABLET | Refills: 0 | Status: SHIPPED | OUTPATIENT
Start: 2025-05-29 | End: 2025-06-28

## 2025-05-29 RX ORDER — DEXTROAMPHETAMINE SACCHARATE, AMPHETAMINE ASPARTATE, DEXTROAMPHETAMINE SULFATE AND AMPHETAMINE SULFATE 5; 5; 5; 5 MG/1; MG/1; MG/1; MG/1
20 TABLET ORAL DAILY
Qty: 30 TABLET | Refills: 0 | Status: SHIPPED | OUTPATIENT
Start: 2025-07-28 | End: 2025-08-27

## 2025-05-29 NOTE — PROGRESS NOTES
Chief Complaint   Patient presents with    Follow-up Chronic Condition     \"Have you been to the ER, urgent care clinic since your last visit?  Hospitalized since your last visit?\"    NO    “Have you seen or consulted any other health care providers outside of [facility] since your last visit?”    NO     /76 (BP Site: Left Upper Arm, Patient Position: Sitting)   Pulse 76   Temp 97.9 °F (36.6 °C) (Temporal)   Ht 1.575 m (5' 2\")   Wt 71.7 kg (158 lb)   SpO2 98%   BMI 28.90 kg/m²      No data recorded         No questionnaires available.                            “Have you had a colorectal cancer screening such as a colonoscopy/FIT/Cologuard?    NO    No colonoscopy on file  No cologuard on file  No FIT/FOBT on file   No flexible sigmoidoscopy on file           Click Here for Release of Records Request     Identified Patient with 2 Patient Identifiers-Name and   
findings in the HPI      Objective   Blood pressure 120/76, pulse 76, temperature 97.9 °F (36.6 °C), temperature source Temporal, height 1.575 m (5' 2\"), weight 71.7 kg (158 lb), SpO2 98%.  Physical Exam  deferred           The patient (or guardian, if applicable) and other individuals in attendance with the patient were advised that Artificial Intelligence will be utilized during this visit to record, process the conversation to generate a clinical note and to support improvement of the AI technology. The patient (or guardian, if applicable) and other individuals in attendance at the appointment consented to the use of AI, including the recording.      An electronic signature was used to authenticate this note.    --DAVID Day - NP

## 2025-06-04 ENCOUNTER — HOSPITAL ENCOUNTER (EMERGENCY)
Facility: HOSPITAL | Age: 55
Discharge: HOME | End: 2025-06-05
Attending: EMERGENCY MEDICINE
Payer: COMMERCIAL

## 2025-06-04 DIAGNOSIS — R55 VASOVAGAL SYNCOPE: Primary | ICD-10-CM

## 2025-06-04 PROCEDURE — 85025 COMPLETE CBC W/AUTO DIFF WBC: CPT

## 2025-06-04 PROCEDURE — 84484 ASSAY OF TROPONIN QUANT: CPT

## 2025-06-04 PROCEDURE — 83880 ASSAY OF NATRIURETIC PEPTIDE: CPT

## 2025-06-04 PROCEDURE — 2580000003 HC RX 258: Performed by: EMERGENCY MEDICINE

## 2025-06-04 PROCEDURE — 83735 ASSAY OF MAGNESIUM: CPT

## 2025-06-04 PROCEDURE — 99284 EMERGENCY DEPT VISIT MOD MDM: CPT

## 2025-06-04 PROCEDURE — 93005 ELECTROCARDIOGRAM TRACING: CPT | Performed by: EMERGENCY MEDICINE

## 2025-06-04 PROCEDURE — 80053 COMPREHEN METABOLIC PANEL: CPT

## 2025-06-04 PROCEDURE — 36415 COLL VENOUS BLD VENIPUNCTURE: CPT

## 2025-06-04 RX ORDER — 0.9 % SODIUM CHLORIDE 0.9 %
1000 INTRAVENOUS SOLUTION INTRAVENOUS ONCE
Status: COMPLETED | OUTPATIENT
Start: 2025-06-04 | End: 2025-06-05

## 2025-06-04 RX ADMIN — SODIUM CHLORIDE 1000 ML: 0.9 INJECTION, SOLUTION INTRAVENOUS at 23:40

## 2025-06-04 ASSESSMENT — PAIN SCALES - GENERAL: PAINLEVEL_OUTOF10: 0

## 2025-06-05 VITALS
HEART RATE: 83 BPM | TEMPERATURE: 97.7 F | WEIGHT: 160 LBS | SYSTOLIC BLOOD PRESSURE: 116 MMHG | OXYGEN SATURATION: 98 % | DIASTOLIC BLOOD PRESSURE: 76 MMHG | HEIGHT: 62 IN | BODY MASS INDEX: 29.44 KG/M2 | RESPIRATION RATE: 17 BRPM

## 2025-06-05 LAB
ALBUMIN SERPL-MCNC: 4.3 G/DL (ref 3.5–5.2)
ALBUMIN/GLOB SERPL: 1.7 (ref 1.1–2.2)
ALP SERPL-CCNC: 71 U/L (ref 35–104)
ALT SERPL-CCNC: 24 U/L (ref 10–35)
ANION GAP SERPL CALC-SCNC: 14 MMOL/L (ref 2–12)
AST SERPL-CCNC: 21 U/L (ref 10–35)
BASOPHILS # BLD: 0 K/UL (ref 0–0.1)
BASOPHILS NFR BLD: 0 % (ref 0–1)
BILIRUB SERPL-MCNC: <0.2 MG/DL (ref 0.2–1)
BUN SERPL-MCNC: 19 MG/DL (ref 6–20)
BUN/CREAT SERPL: 28 (ref 12–20)
CALCIUM SERPL-MCNC: 8.8 MG/DL (ref 8.6–10)
CHLORIDE SERPL-SCNC: 104 MMOL/L (ref 98–107)
CO2 SERPL-SCNC: 22 MMOL/L (ref 22–29)
CREAT SERPL-MCNC: 0.69 MG/DL (ref 0.5–0.9)
DIFFERENTIAL METHOD BLD: ABNORMAL
EKG ATRIAL RATE: 74 BPM
EKG DIAGNOSIS: NORMAL
EKG P AXIS: 60 DEGREES
EKG P-R INTERVAL: 132 MS
EKG Q-T INTERVAL: 416 MS
EKG QRS DURATION: 86 MS
EKG QTC CALCULATION (BAZETT): 461 MS
EKG R AXIS: 52 DEGREES
EKG T AXIS: 57 DEGREES
EKG VENTRICULAR RATE: 74 BPM
EOSINOPHIL # BLD: 0.18 K/UL (ref 0–0.4)
EOSINOPHIL NFR BLD: 2 % (ref 0–7)
ERYTHROCYTE [DISTWIDTH] IN BLOOD BY AUTOMATED COUNT: 12.6 % (ref 11.5–14.5)
GLOBULIN SER CALC-MCNC: 2.6 G/DL (ref 2–4)
GLUCOSE SERPL-MCNC: 99 MG/DL (ref 65–100)
HCT VFR BLD AUTO: 37.9 % (ref 35–47)
HGB BLD-MCNC: 13.2 G/DL (ref 11.5–16)
IMM GRANULOCYTES # BLD AUTO: 0 K/UL
IMM GRANULOCYTES NFR BLD AUTO: 0 %
LYMPHOCYTES # BLD: 4.31 K/UL (ref 0.8–3.5)
LYMPHOCYTES NFR BLD: 49 % (ref 12–49)
MAGNESIUM SERPL-MCNC: 2.1 MG/DL (ref 1.6–2.6)
MCH RBC QN AUTO: 32.3 PG (ref 26–34)
MCHC RBC AUTO-ENTMCNC: 34.8 G/DL (ref 30–36.5)
MCV RBC AUTO: 92.7 FL (ref 80–99)
MONOCYTES # BLD: 0.35 K/UL (ref 0–1)
MONOCYTES NFR BLD: 4 % (ref 5–13)
NEUTS SEG # BLD: 3.96 K/UL (ref 1.8–8)
NEUTS SEG NFR BLD: 45 % (ref 32–75)
NRBC # BLD: 0 K/UL (ref 0–0.01)
NRBC BLD-RTO: 0 PER 100 WBC
NT PRO BNP: 59 PG/ML
PLATELET # BLD AUTO: 297 K/UL (ref 150–400)
PMV BLD AUTO: 8.8 FL (ref 8.9–12.9)
POTASSIUM SERPL-SCNC: 3.6 MMOL/L (ref 3.5–5.1)
PROT SERPL-MCNC: 6.9 G/DL (ref 6.4–8.3)
RBC # BLD AUTO: 4.09 M/UL (ref 3.8–5.2)
RBC MORPH BLD: ABNORMAL
RBC MORPH BLD: ABNORMAL
SODIUM SERPL-SCNC: 140 MMOL/L (ref 136–145)
TROPONIN T SERPL HS-MCNC: <6 NG/L (ref 0–14)
WBC # BLD AUTO: 8.8 K/UL (ref 3.6–11)
WBC MORPH BLD: ABNORMAL

## 2025-06-05 PROCEDURE — 93010 ELECTROCARDIOGRAM REPORT: CPT | Performed by: SPECIALIST

## 2025-06-05 NOTE — ED TRIAGE NOTES
Patient to the ED to visit with family. While waiting with her daughter patient reports feeling weak, dizzy, hot and the need to lay down.   Patient then slid down in the chair and had to be assisted to a stretcher. Patient reports feeling tired all day. Care team at bedside to assist patient to room 2.

## 2025-06-05 NOTE — ED PROVIDER NOTES
[facility] EMERGENCY DEPARTMENT  EMERGENCY DEPARTMENT ENCOUNTER      Pt Name: Krystin Del Toro  MRN: [id number]  Birthdate 1970  Date of evaluation: 6/4/2025  Provider: Som Avila MD    CHIEF COMPLAINT       Chief Complaint   Patient presents with    Fatigue    Dizziness       EMERGENCY DEPARTMENT COURSE and DIFFERENTIAL DIAGNOSIS/MDM:   Medical Decision Making  55-year-old female checked in to the ER after having a syncopal event.  Patient was visiting a family member.  Patient reports feeling slightly fatigued today and while watching the family will get an IV she got very dizzy warm flushed lightheaded and passed out.  Passed out in the chair nurses at bedside quickly caught her.  No head trauma or injuries or falls.  Patient was diaphoretic and initially bradycardic and had slightly low blood pressure.  Patient was placed on an ER stretcher.  Had normal blood sugar.  EKG unremarkable.  Patient had no seizure-like activity.  Was awake and alert after syncopal events.  Denies any chest pain or shortness of breath.  Reports that she has had previous episodes of syncope when seen blood.  No history of any cardiac abnormalities.  Denies any fevers or chills.  Had eaten recently.  On exam no cardiac murmurs.  Initially bradycardic however this improved.  Patient's low blood pressure improved with time and IV fluids.  No lower leg swelling normal pulses.  No cardiac murmurs.  No focal logic deficits on exam.  Patient's labs electrolytes unremarkable normal troponin.  Patient now feeling significantly better just mildly fatigued and tired.  No evidence of any dysrhythmias or ischemia.  No signs of anemia.  No dysrhythmias on cardiac monitor in ER observation.  No electrolyte abnormalities or hypoglycemia.  Patient's symptoms consistent with vasovagal syncope.  I discussed symptomatic treatment follow-up primary care provider.  Patient stable for discharge.    Total critical care time (not including time spent  Total Bilirubin <0.2 (L) 0.2 - 1.0 MG/DL    ALT 24 10 - 35 U/L    AST 21 10 - 35 U/L    Alk Phosphatase 71 35 - 104 U/L    Total Protein 6.9 6.4 - 8.3 g/dL    Albumin 4.3 3.5 - 5.2 g/dL    Globulin 2.6 2.0 - 4.0 g/dL    Albumin/Globulin Ratio 1.7 1.1 - 2.2     Magnesium   Result Value Ref Range    Magnesium 2.1 1.6 - 2.6 mg/dL   Troponin T   Result Value Ref Range    Troponin T <6.0 0 - 14 ng/L   Brain Natriuretic Peptide   Result Value Ref Range    NT Pro-BNP 59 <126 PG/ML   EKG 12 Lead   Result Value Ref Range    Ventricular Rate 74 BPM    Atrial Rate 74 BPM    P-R Interval 132 ms    QRS Duration 86 ms    Q-T Interval 416 ms    QTc Calculation (Bazett) 461 ms    P Axis 60 degrees    R Axis 52 degrees    T Axis 57 degrees    Diagnosis       Normal sinus rhythm  Normal ECG  When compared with ECG of 15-MACHO-2024 22:26,  No significant change was found            CONSULTS:  None    PROCEDURES:  Unless otherwise noted below, none     Procedures      FINAL IMPRESSION      1. Vasovagal syncope          DISPOSITION/PLAN   DISPOSITION Decision To Discharge 06/05/2025 12:22:03 AM      PATIENT REFERRED TO:  Berna Vázquez, [place]N - [place]  [id number] [address] [address] [phone]    Schedule an appointment as soon as possible for a visit         DISCHARGE MEDICATIONS:  New Prescriptions    No medications on file         (Please note that portions of this note were completed with a voice recognition program.  Efforts were made to edit the dictations but occasionally words are mis-transcribed.)    Som Avila MD (electronically signed)  Emergency Attending Physician            Som Avila MD  06/05/25 0042

## 2025-07-28 ENCOUNTER — PATIENT MESSAGE (OUTPATIENT)
Facility: CLINIC | Age: 55
End: 2025-07-28

## 2025-07-31 ENCOUNTER — OFFICE VISIT (OUTPATIENT)
Facility: CLINIC | Age: 55
End: 2025-07-31
Payer: COMMERCIAL

## 2025-07-31 VITALS
HEIGHT: 62 IN | HEART RATE: 96 BPM | SYSTOLIC BLOOD PRESSURE: 128 MMHG | BODY MASS INDEX: 29.15 KG/M2 | WEIGHT: 158.38 LBS | RESPIRATION RATE: 16 BRPM | TEMPERATURE: 97.3 F | OXYGEN SATURATION: 98 % | DIASTOLIC BLOOD PRESSURE: 80 MMHG

## 2025-07-31 DIAGNOSIS — Z72.0 VAPES NICOTINE CONTAINING SUBSTANCE: ICD-10-CM

## 2025-07-31 DIAGNOSIS — R73.03 PREDIABETES: ICD-10-CM

## 2025-07-31 DIAGNOSIS — Z12.11 COLON CANCER SCREENING: ICD-10-CM

## 2025-07-31 DIAGNOSIS — Z12.31 BREAST CANCER SCREENING BY MAMMOGRAM: ICD-10-CM

## 2025-07-31 DIAGNOSIS — F41.9 ANXIETY: ICD-10-CM

## 2025-07-31 DIAGNOSIS — I10 PRIMARY HYPERTENSION: ICD-10-CM

## 2025-07-31 DIAGNOSIS — E66.3 OVERWEIGHT WITH BODY MASS INDEX (BMI) OF 28 TO 28.9 IN ADULT: ICD-10-CM

## 2025-07-31 DIAGNOSIS — F98.8 ATTENTION DEFICIT DISORDER, UNSPECIFIED TYPE: ICD-10-CM

## 2025-07-31 DIAGNOSIS — H57.9 DISORDER OF EYE: Primary | ICD-10-CM

## 2025-07-31 DIAGNOSIS — L40.50 ARTHROPATHIC PSORIASIS, UNSPECIFIED (HCC): ICD-10-CM

## 2025-07-31 DIAGNOSIS — E03.9 ACQUIRED HYPOTHYROIDISM: ICD-10-CM

## 2025-07-31 DIAGNOSIS — Z23 ENCOUNTER FOR IMMUNIZATION: ICD-10-CM

## 2025-07-31 DIAGNOSIS — Z01.818 PREOP EXAMINATION: ICD-10-CM

## 2025-07-31 PROCEDURE — 3079F DIAST BP 80-89 MM HG: CPT | Performed by: NURSE PRACTITIONER

## 2025-07-31 PROCEDURE — 3074F SYST BP LT 130 MM HG: CPT | Performed by: NURSE PRACTITIONER

## 2025-07-31 PROCEDURE — 99214 OFFICE O/P EST MOD 30 MIN: CPT | Performed by: NURSE PRACTITIONER

## 2025-07-31 ASSESSMENT — PATIENT HEALTH QUESTIONNAIRE - PHQ9
2. FEELING DOWN, DEPRESSED OR HOPELESS: NOT AT ALL
SUM OF ALL RESPONSES TO PHQ QUESTIONS 1-9: 0
SUM OF ALL RESPONSES TO PHQ QUESTIONS 1-9: 0
1. LITTLE INTEREST OR PLEASURE IN DOING THINGS: NOT AT ALL
SUM OF ALL RESPONSES TO PHQ QUESTIONS 1-9: 0
SUM OF ALL RESPONSES TO PHQ QUESTIONS 1-9: 0

## 2025-07-31 NOTE — PROGRESS NOTES
Chief Complaint   Patient presents with    Pre-op Exam     Left eye surgery     \"Have you been to the ER, urgent care clinic since your last visit?  Hospitalized since your last visit?\"    NO    “Have you seen or consulted any other health care providers outside of [facility] since your last visit?”    NO        “Have you had a colorectal cancer screening such as a colonoscopy/FIT/Cologuard?    NO    No colonoscopy on file  No cologuard on file  No FIT/FOBT on file   No flexible sigmoidoscopy on file         Click Here for Release of Records Request   PHQ-9 Total Score: 0 (7/31/2025  1:06 PM)

## 2025-07-31 NOTE — PROGRESS NOTES
Chief Complaint   Patient presents with    Pre-op Exam     Left eye surgery         Subjective   History of Present Illness  The patient presents for a preoperative exam for left eye surgery.    She is scheduled for a procedure to unblock a tear duct in her left eye, which is likely obstructed in the sac. The surgery will be performed by Dr. Pramod Sherman on 08/22/2025 under general anesthesia. She has no history of complications with anesthesia or latex allergies.  She denies a personal or family history of malignant hyperthermia.  She plans to return home with her family post-procedure.    She is currently on Adderall for ADHD and lisinopril for blood pressure management. She does not monitor her blood pressure or pulse at home and reports no concerns about them being too high or low. She has not experienced any episodes of tachycardia or palpitations.    She has been taking Trileptal for several years but is unsure of the exact reason. Her therapist, who retired during the COVID-19 pandemic, had prescribed it. She was also prescribed sertraline for anxiety. She uses alprazolam as needed, typically once a day, but occasionally twice. This is prescribed by Berna. She also has a medical marijuana card, which she finds helpful for sleep as she can no longer take Ambien.    She is under the care of a rheumatologist for psoriatic arthritis and has no concerns related to this condition. Her blood work is conducted every 3 months.    She has been less active during the summer but reports no cardiopulmonary issues such as coughing, wheezing, or shortness of breath. She does not have difficulty exerting herself, whether it is climbing stairs or walking long distances. She works as a  at an elementary school and is able to perform her duties without any problems.    She is uncertain about having hypothyroidism, but her younger sister had to have her thyroid removed.    She was diagnosed with

## 2025-07-31 NOTE — PATIENT INSTRUCTIONS
I recommend going to your pharmacy for the following vaccines:  Tdap (Tetanus, diptheria, pertussis)  Updated COVID  Prevnar 20 (pneumonia)  Annual flu shot

## 2025-08-01 LAB
ALBUMIN SERPL-MCNC: 4.6 G/DL (ref 3.8–4.9)
ALP SERPL-CCNC: 87 IU/L (ref 44–121)
ALT SERPL-CCNC: 21 IU/L (ref 0–32)
AST SERPL-CCNC: 18 IU/L (ref 0–40)
BILIRUB SERPL-MCNC: 0.2 MG/DL (ref 0–1.2)
BUN SERPL-MCNC: 9 MG/DL (ref 6–24)
BUN/CREAT SERPL: 13 (ref 9–23)
CALCIUM SERPL-MCNC: 9.7 MG/DL (ref 8.7–10.2)
CHLORIDE SERPL-SCNC: 100 MMOL/L (ref 96–106)
CO2 SERPL-SCNC: 24 MMOL/L (ref 20–29)
CREAT SERPL-MCNC: 0.68 MG/DL (ref 0.57–1)
EGFRCR SERPLBLD CKD-EPI 2021: 103 ML/MIN/1.73
EST. AVERAGE GLUCOSE BLD GHB EST-MCNC: 111 MG/DL
GLOBULIN SER CALC-MCNC: 2.7 G/DL (ref 1.5–4.5)
GLUCOSE SERPL-MCNC: 98 MG/DL (ref 70–99)
HBA1C MFR BLD: 5.5 % (ref 4.8–5.6)
POTASSIUM SERPL-SCNC: 3.6 MMOL/L (ref 3.5–5.2)
PROT SERPL-MCNC: 7.3 G/DL (ref 6–8.5)
SODIUM SERPL-SCNC: 139 MMOL/L (ref 134–144)
T4 FREE SERPL-MCNC: 0.77 NG/DL (ref 0.82–1.77)
TSH SERPL DL<=0.005 MIU/L-ACNC: 0.82 UIU/ML (ref 0.45–4.5)

## 2025-08-03 LAB — OXCARBAZEPINE SERPL-MCNC: 11 UG/ML (ref 10–35)

## 2025-08-21 DIAGNOSIS — R03.0 ELEVATED BLOOD-PRESSURE READING, WITHOUT DIAGNOSIS OF HYPERTENSION: ICD-10-CM

## 2025-08-21 DIAGNOSIS — B96.89 ACUTE BACTERIAL SINUSITIS: ICD-10-CM

## 2025-08-21 DIAGNOSIS — F90.2 ATTENTION DEFICIT HYPERACTIVITY DISORDER (ADHD), COMBINED TYPE: ICD-10-CM

## 2025-08-21 DIAGNOSIS — J01.90 ACUTE BACTERIAL SINUSITIS: ICD-10-CM

## 2025-08-21 RX ORDER — DEXTROAMPHETAMINE SACCHARATE, AMPHETAMINE ASPARTATE, DEXTROAMPHETAMINE SULFATE AND AMPHETAMINE SULFATE 5; 5; 5; 5 MG/1; MG/1; MG/1; MG/1
20 TABLET ORAL 2 TIMES DAILY
Qty: 60 TABLET | Refills: 0 | Status: SHIPPED | OUTPATIENT
Start: 2025-08-21 | End: 2025-09-20

## 2025-08-21 RX ORDER — OXCARBAZEPINE 300 MG/1
TABLET, FILM COATED ORAL
Qty: 90 TABLET | Refills: 5 | Status: SHIPPED | OUTPATIENT
Start: 2025-08-21

## 2025-08-21 RX ORDER — CETIRIZINE HYDROCHLORIDE 10 MG/1
10 TABLET ORAL DAILY
Qty: 90 TABLET | Refills: 3 | Status: SHIPPED | OUTPATIENT
Start: 2025-08-21

## 2025-08-21 RX ORDER — LISINOPRIL 10 MG/1
10 TABLET ORAL DAILY
Qty: 90 TABLET | Refills: 1 | Status: SHIPPED | OUTPATIENT
Start: 2025-08-21

## 2025-09-03 ENCOUNTER — TELEMEDICINE (OUTPATIENT)
Facility: CLINIC | Age: 55
End: 2025-09-03
Payer: COMMERCIAL

## 2025-09-03 DIAGNOSIS — F41.9 ANXIETY: Primary | ICD-10-CM

## 2025-09-03 DIAGNOSIS — F90.2 ATTENTION DEFICIT HYPERACTIVITY DISORDER (ADHD), COMBINED TYPE: ICD-10-CM

## 2025-09-03 DIAGNOSIS — L40.50 ARTHROPATHIC PSORIASIS, UNSPECIFIED (HCC): ICD-10-CM

## 2025-09-03 DIAGNOSIS — Z23 NEED FOR COVID-19 VACCINE: ICD-10-CM

## 2025-09-03 PROCEDURE — 99214 OFFICE O/P EST MOD 30 MIN: CPT | Performed by: NURSE PRACTITIONER

## 2025-09-03 RX ORDER — DEXTROAMPHETAMINE SACCHARATE, AMPHETAMINE ASPARTATE, DEXTROAMPHETAMINE SULFATE AND AMPHETAMINE SULFATE 5; 5; 5; 5 MG/1; MG/1; MG/1; MG/1
20 TABLET ORAL 2 TIMES DAILY
Qty: 60 TABLET | Refills: 0 | Status: CANCELLED | OUTPATIENT
Start: 2025-09-03 | End: 2025-10-03

## 2025-09-03 RX ORDER — DEXTROAMPHETAMINE SACCHARATE, AMPHETAMINE ASPARTATE, DEXTROAMPHETAMINE SULFATE AND AMPHETAMINE SULFATE 5; 5; 5; 5 MG/1; MG/1; MG/1; MG/1
20 TABLET ORAL 2 TIMES DAILY
Qty: 60 TABLET | Refills: 0 | Status: SHIPPED | OUTPATIENT
Start: 2025-11-02 | End: 2025-12-02

## 2025-09-03 RX ORDER — FLUTICASONE PROPIONATE 50 MCG
2 SPRAY, SUSPENSION (ML) NASAL DAILY
Qty: 16 G | Refills: 3 | Status: SHIPPED | OUTPATIENT
Start: 2025-09-03

## 2025-09-03 RX ORDER — DEXTROAMPHETAMINE SACCHARATE, AMPHETAMINE ASPARTATE, DEXTROAMPHETAMINE SULFATE AND AMPHETAMINE SULFATE 5; 5; 5; 5 MG/1; MG/1; MG/1; MG/1
20 TABLET ORAL 2 TIMES DAILY
Qty: 60 TABLET | Refills: 0 | Status: SHIPPED | OUTPATIENT
Start: 2025-10-03 | End: 2025-11-02

## 2025-09-03 RX ORDER — CLOBETASOL PROPIONATE 0.5 MG/G
OINTMENT TOPICAL 2 TIMES DAILY
Qty: 60 G | Refills: 0 | Status: SHIPPED | OUTPATIENT
Start: 2025-09-03

## 2025-09-03 RX ORDER — DEXTROAMPHETAMINE SACCHARATE, AMPHETAMINE ASPARTATE, DEXTROAMPHETAMINE SULFATE AND AMPHETAMINE SULFATE 5; 5; 5; 5 MG/1; MG/1; MG/1; MG/1
20 TABLET ORAL 2 TIMES DAILY
Qty: 60 TABLET | Refills: 0 | Status: SHIPPED | OUTPATIENT
Start: 2025-09-03 | End: 2025-10-03

## 2025-09-03 RX ORDER — ALPRAZOLAM 0.5 MG
0.5 TABLET ORAL 2 TIMES DAILY PRN
Qty: 60 TABLET | Refills: 2 | Status: SHIPPED | OUTPATIENT
Start: 2025-09-03 | End: 2025-10-03

## 2025-09-03 RX ORDER — NEOMYCIN SULFATE, POLYMYXIN B SULFATE, AND DEXAMETHASONE 3.5; 10000; 1 MG/G; [USP'U]/G; MG/G
OINTMENT OPHTHALMIC
COMMUNITY
Start: 2025-07-08

## (undated) DEVICE — Device

## (undated) DEVICE — REM POLYHESIVE ADULT PATIENT RETURN ELECTRODE: Brand: VALLEYLAB

## (undated) DEVICE — DERMABOND SKIN ADH 0.7ML -- DERMABOND ADVANCED 12/BX

## (undated) DEVICE — INFECTION CONTROL KIT SYS

## (undated) DEVICE — STERILE POLYISOPRENE POWDER-FREE SURGICAL GLOVES WITH EMOLLIENT COATING: Brand: PROTEXIS

## (undated) DEVICE — SOLUTION IV 1000ML 0.9% SOD CHL

## (undated) DEVICE — PACK,EENT,TURBAN DRAPE,PK II: Brand: MEDLINE

## (undated) DEVICE — TRAY PREP DRY W/ PREM GLV 2 APPL 6 SPNG 2 UNDPD 1 OVERWRAP

## (undated) DEVICE — DEVON™ KNEE AND BODY STRAP 60" X 3" (1.5 M X 7.6 CM): Brand: DEVON